# Patient Record
Sex: MALE | Race: WHITE | NOT HISPANIC OR LATINO | Employment: OTHER | URBAN - METROPOLITAN AREA
[De-identification: names, ages, dates, MRNs, and addresses within clinical notes are randomized per-mention and may not be internally consistent; named-entity substitution may affect disease eponyms.]

---

## 2020-01-03 ENCOUNTER — HOSPITAL ENCOUNTER (INPATIENT)
Facility: MEDICAL CENTER | Age: 85
LOS: 6 days | DRG: 183 | End: 2020-01-09
Attending: EMERGENCY MEDICINE | Admitting: HOSPITALIST
Payer: MEDICARE

## 2020-01-03 ENCOUNTER — HOSPITAL ENCOUNTER (OUTPATIENT)
Dept: RADIOLOGY | Facility: MEDICAL CENTER | Age: 85
End: 2020-01-03

## 2020-01-03 ENCOUNTER — APPOINTMENT (OUTPATIENT)
Dept: RADIOLOGY | Facility: MEDICAL CENTER | Age: 85
DRG: 183 | End: 2020-01-03
Attending: HOSPITALIST
Payer: MEDICARE

## 2020-01-03 DIAGNOSIS — S22.41XA CLOSED FRACTURE OF MULTIPLE RIBS OF RIGHT SIDE, INITIAL ENCOUNTER: ICD-10-CM

## 2020-01-03 DIAGNOSIS — R09.02 HYPOXIA: ICD-10-CM

## 2020-01-03 DIAGNOSIS — K59.00 CONSTIPATION, UNSPECIFIED CONSTIPATION TYPE: ICD-10-CM
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
## 2020-01-03 PROBLEM — Z86.711 HISTORY OF PULMONARY EMBOLISM: Status: ACTIVE | Noted: 2020-01-03

## 2020-01-03 PROBLEM — J96.01 ACUTE HYPOXEMIC RESPIRATORY FAILURE (HCC): Status: ACTIVE | Noted: 2020-01-03

## 2020-01-03 PROBLEM — N40.0 BPH (BENIGN PROSTATIC HYPERPLASIA): Status: ACTIVE | Noted: 2020-01-03

## 2020-01-03 PROBLEM — Z01.89 ENCOUNTER FOR GERIATRIC ASSESSMENT: Status: ACTIVE | Noted: 2020-01-03

## 2020-01-03 PROBLEM — S22.49XA FRACTURE OF MULTIPLE RIBS: Status: ACTIVE | Noted: 2020-01-03

## 2020-01-03 PROBLEM — I25.10 CAD (CORONARY ARTERY DISEASE): Status: ACTIVE | Noted: 2020-01-03

## 2020-01-03 PROBLEM — J90 PLEURAL EFFUSION: Status: ACTIVE | Noted: 2020-01-03

## 2020-01-03 PROBLEM — I10 HTN (HYPERTENSION): Status: ACTIVE | Noted: 2020-01-03

## 2020-01-03 LAB
ALBUMIN SERPL BCP-MCNC: 3.6 G/DL (ref 3.2–4.9)
ALBUMIN/GLOB SERPL: 1.3 G/DL
ALP SERPL-CCNC: 94 U/L (ref 30–99)
ALT SERPL-CCNC: 42 U/L (ref 2–50)
ANION GAP SERPL CALC-SCNC: 12 MMOL/L (ref 0–11.9)
AST SERPL-CCNC: 70 U/L (ref 12–45)
BASOPHILS # BLD AUTO: 0.4 % (ref 0–1.8)
BASOPHILS # BLD: 0.03 K/UL (ref 0–0.12)
BILIRUB SERPL-MCNC: 1 MG/DL (ref 0.1–1.5)
BUN SERPL-MCNC: 20 MG/DL (ref 8–22)
CALCIUM SERPL-MCNC: 8.2 MG/DL (ref 8.5–10.5)
CHLORIDE SERPL-SCNC: 99 MMOL/L (ref 96–112)
CO2 SERPL-SCNC: 22 MMOL/L (ref 20–33)
CREAT SERPL-MCNC: 1.05 MG/DL (ref 0.5–1.4)
EKG IMPRESSION: NORMAL
EOSINOPHIL # BLD AUTO: 0.05 K/UL (ref 0–0.51)
EOSINOPHIL NFR BLD: 0.7 % (ref 0–6.9)
ERYTHROCYTE [DISTWIDTH] IN BLOOD BY AUTOMATED COUNT: 47.2 FL (ref 35.9–50)
GLOBULIN SER CALC-MCNC: 2.7 G/DL (ref 1.9–3.5)
GLUCOSE SERPL-MCNC: 153 MG/DL (ref 65–99)
HCT VFR BLD AUTO: 37.1 % (ref 42–52)
HGB BLD-MCNC: 12.2 G/DL (ref 14–18)
IMM GRANULOCYTES # BLD AUTO: 0.02 K/UL (ref 0–0.11)
IMM GRANULOCYTES NFR BLD AUTO: 0.3 % (ref 0–0.9)
LYMPHOCYTES # BLD AUTO: 0.76 K/UL (ref 1–4.8)
LYMPHOCYTES NFR BLD: 11.4 % (ref 22–41)
MCH RBC QN AUTO: 32 PG (ref 27–33)
MCHC RBC AUTO-ENTMCNC: 32.9 G/DL (ref 33.7–35.3)
MCV RBC AUTO: 97.4 FL (ref 81.4–97.8)
MONOCYTES # BLD AUTO: 0.58 K/UL (ref 0–0.85)
MONOCYTES NFR BLD AUTO: 8.7 % (ref 0–13.4)
NEUTROPHILS # BLD AUTO: 5.24 K/UL (ref 1.82–7.42)
NEUTROPHILS NFR BLD: 78.5 % (ref 44–72)
NRBC # BLD AUTO: 0 K/UL
NRBC BLD-RTO: 0 /100 WBC
PLATELET # BLD AUTO: 193 K/UL (ref 164–446)
PMV BLD AUTO: 10.4 FL (ref 9–12.9)
POTASSIUM SERPL-SCNC: 4.2 MMOL/L (ref 3.6–5.5)
PROT SERPL-MCNC: 6.3 G/DL (ref 6–8.2)
RBC # BLD AUTO: 3.81 M/UL (ref 4.7–6.1)
SODIUM SERPL-SCNC: 133 MMOL/L (ref 135–145)
WBC # BLD AUTO: 6.7 K/UL (ref 4.8–10.8)

## 2020-01-03 PROCEDURE — 99223 1ST HOSP IP/OBS HIGH 75: CPT | Performed by: HOSPITALIST

## 2020-01-03 PROCEDURE — 36415 COLL VENOUS BLD VENIPUNCTURE: CPT

## 2020-01-03 PROCEDURE — 93005 ELECTROCARDIOGRAM TRACING: CPT | Performed by: SURGERY

## 2020-01-03 PROCEDURE — 94760 N-INVAS EAR/PLS OXIMETRY 1: CPT

## 2020-01-03 PROCEDURE — 85025 COMPLETE CBC W/AUTO DIFF WBC: CPT | Mod: 91

## 2020-01-03 PROCEDURE — 99285 EMERGENCY DEPT VISIT HI MDM: CPT

## 2020-01-03 PROCEDURE — 99358 PROLONG SERVICE W/O CONTACT: CPT | Performed by: HOSPITALIST

## 2020-01-03 PROCEDURE — 770006 HCHG ROOM/CARE - MED/SURG/GYN SEMI*

## 2020-01-03 PROCEDURE — 74018 RADEX ABDOMEN 1 VIEW: CPT

## 2020-01-03 PROCEDURE — 80053 COMPREHEN METABOLIC PANEL: CPT | Mod: 91

## 2020-01-03 RX ORDER — HYDROMORPHONE HYDROCHLORIDE 1 MG/ML
0.25 INJECTION, SOLUTION INTRAMUSCULAR; INTRAVENOUS; SUBCUTANEOUS
Status: DISCONTINUED | OUTPATIENT
Start: 2020-01-03 | End: 2020-01-04

## 2020-01-03 RX ORDER — POLYETHYLENE GLYCOL 3350 17 G/17G
1 POWDER, FOR SOLUTION ORAL
Status: DISCONTINUED | OUTPATIENT
Start: 2020-01-03 | End: 2020-01-05

## 2020-01-03 RX ORDER — BISACODYL 10 MG
10 SUPPOSITORY, RECTAL RECTAL
Status: DISCONTINUED | OUTPATIENT
Start: 2020-01-03 | End: 2020-01-03

## 2020-01-03 RX ORDER — ENEMA 19; 7 G/133ML; G/133ML
1 ENEMA RECTAL ONCE
Status: COMPLETED | OUTPATIENT
Start: 2020-01-04 | End: 2020-01-04

## 2020-01-03 RX ORDER — TAMSULOSIN HYDROCHLORIDE 0.4 MG/1
0.4 CAPSULE ORAL
Status: DISCONTINUED | OUTPATIENT
Start: 2020-01-04 | End: 2020-01-10 | Stop reason: HOSPADM

## 2020-01-03 RX ORDER — METOPROLOL SUCCINATE 25 MG/1
25 TABLET, EXTENDED RELEASE ORAL
Status: DISCONTINUED | OUTPATIENT
Start: 2020-01-04 | End: 2020-01-10 | Stop reason: HOSPADM

## 2020-01-03 RX ORDER — OXYCODONE HYDROCHLORIDE 5 MG/1
2.5 TABLET ORAL
Status: DISCONTINUED | OUTPATIENT
Start: 2020-01-03 | End: 2020-01-10 | Stop reason: HOSPADM

## 2020-01-03 RX ORDER — ONDANSETRON 2 MG/ML
4 INJECTION INTRAMUSCULAR; INTRAVENOUS EVERY 4 HOURS PRN
Status: DISCONTINUED | OUTPATIENT
Start: 2020-01-03 | End: 2020-01-10 | Stop reason: HOSPADM

## 2020-01-03 RX ORDER — GABAPENTIN 100 MG/1
100 CAPSULE ORAL 3 TIMES DAILY
Status: DISCONTINUED | OUTPATIENT
Start: 2020-01-04 | End: 2020-01-05

## 2020-01-03 RX ORDER — BISACODYL 10 MG
10 SUPPOSITORY, RECTAL RECTAL DAILY
Status: DISCONTINUED | OUTPATIENT
Start: 2020-01-04 | End: 2020-01-03

## 2020-01-03 RX ORDER — PRAVASTATIN SODIUM 20 MG
40 TABLET ORAL NIGHTLY
Status: DISCONTINUED | OUTPATIENT
Start: 2020-01-03 | End: 2020-01-10 | Stop reason: HOSPADM

## 2020-01-03 RX ORDER — METOPROLOL SUCCINATE 25 MG/1
25 TABLET, EXTENDED RELEASE ORAL DAILY
COMMUNITY

## 2020-01-03 RX ORDER — ESOMEPRAZOLE MAGNESIUM 40 MG/1
40 CAPSULE, DELAYED RELEASE ORAL
Status: DISCONTINUED | OUTPATIENT
Start: 2020-01-04 | End: 2020-01-03

## 2020-01-03 RX ORDER — ACETAMINOPHEN 325 MG/1
650 TABLET ORAL 4 TIMES DAILY
Status: DISCONTINUED | OUTPATIENT
Start: 2020-01-04 | End: 2020-01-03

## 2020-01-03 RX ORDER — AMOXICILLIN 250 MG
2 CAPSULE ORAL 2 TIMES DAILY
Status: DISCONTINUED | OUTPATIENT
Start: 2020-01-03 | End: 2020-01-05

## 2020-01-03 RX ORDER — AMITRIPTYLINE HYDROCHLORIDE 75 MG/1
75 TABLET ORAL NIGHTLY
Status: DISCONTINUED | OUTPATIENT
Start: 2020-01-03 | End: 2020-01-10 | Stop reason: HOSPADM

## 2020-01-03 RX ORDER — ACETAMINOPHEN 325 MG/1
650 TABLET ORAL 4 TIMES DAILY
Status: DISCONTINUED | OUTPATIENT
Start: 2020-01-04 | End: 2020-01-04

## 2020-01-03 RX ORDER — OMEPRAZOLE 20 MG/1
20 CAPSULE, DELAYED RELEASE ORAL
Status: DISCONTINUED | OUTPATIENT
Start: 2020-01-04 | End: 2020-01-10 | Stop reason: HOSPADM

## 2020-01-03 RX ORDER — LIDOCAINE 50 MG/G
1 PATCH TOPICAL EVERY 24 HOURS
Status: DISCONTINUED | OUTPATIENT
Start: 2020-01-04 | End: 2020-01-10 | Stop reason: HOSPADM

## 2020-01-03 RX ORDER — CELECOXIB 100 MG/1
100 CAPSULE ORAL DAILY
Status: DISCONTINUED | OUTPATIENT
Start: 2020-01-04 | End: 2020-01-10 | Stop reason: HOSPADM

## 2020-01-03 RX ORDER — ACETAMINOPHEN 325 MG/1
650 TABLET ORAL EVERY 6 HOURS PRN
Status: DISCONTINUED | OUTPATIENT
Start: 2020-01-03 | End: 2020-01-03

## 2020-01-03 RX ORDER — ONDANSETRON 4 MG/1
4 TABLET, ORALLY DISINTEGRATING ORAL EVERY 4 HOURS PRN
Status: DISCONTINUED | OUTPATIENT
Start: 2020-01-03 | End: 2020-01-10 | Stop reason: HOSPADM

## 2020-01-03 RX ORDER — OXYCODONE HYDROCHLORIDE 5 MG/1
5 TABLET ORAL
Status: DISCONTINUED | OUTPATIENT
Start: 2020-01-03 | End: 2020-01-04

## 2020-01-04 ENCOUNTER — APPOINTMENT (OUTPATIENT)
Dept: RADIOLOGY | Facility: MEDICAL CENTER | Age: 85
DRG: 183 | End: 2020-01-04
Attending: SURGERY
Payer: MEDICARE

## 2020-01-04 ENCOUNTER — APPOINTMENT (OUTPATIENT)
Dept: RADIOLOGY | Facility: MEDICAL CENTER | Age: 85
DRG: 183 | End: 2020-01-04
Attending: NURSE PRACTITIONER
Payer: MEDICARE

## 2020-01-04 PROBLEM — K59.00 CONSTIPATION: Status: ACTIVE | Noted: 2020-01-04

## 2020-01-04 PROBLEM — D64.9 ANEMIA: Status: ACTIVE | Noted: 2020-01-04

## 2020-01-04 LAB
ALBUMIN SERPL BCP-MCNC: 3.4 G/DL (ref 3.2–4.9)
ALBUMIN SERPL BCP-MCNC: 3.5 G/DL (ref 3.2–4.9)
ALBUMIN/GLOB SERPL: 1.2 G/DL
ALBUMIN/GLOB SERPL: 1.3 G/DL
ALP SERPL-CCNC: 123 U/L (ref 30–99)
ALP SERPL-CCNC: 125 U/L (ref 30–99)
ALT SERPL-CCNC: 116 U/L (ref 2–50)
ALT SERPL-CCNC: 118 U/L (ref 2–50)
AMMONIA PLAS-SCNC: 41 UMOL/L (ref 11–45)
ANION GAP SERPL CALC-SCNC: 8 MMOL/L (ref 0–11.9)
ANION GAP SERPL CALC-SCNC: 9 MMOL/L (ref 0–11.9)
APAP SERPL-MCNC: <10 UG/ML (ref 10–30)
AST SERPL-CCNC: 143 U/L (ref 12–45)
AST SERPL-CCNC: 170 U/L (ref 12–45)
BASOPHILS # BLD AUTO: 0.3 % (ref 0–1.8)
BASOPHILS # BLD AUTO: 0.5 % (ref 0–1.8)
BASOPHILS # BLD: 0.02 K/UL (ref 0–0.12)
BASOPHILS # BLD: 0.03 K/UL (ref 0–0.12)
BILIRUB SERPL-MCNC: 1 MG/DL (ref 0.1–1.5)
BILIRUB SERPL-MCNC: 1 MG/DL (ref 0.1–1.5)
BUN SERPL-MCNC: 22 MG/DL (ref 8–22)
BUN SERPL-MCNC: 22 MG/DL (ref 8–22)
CALCIUM SERPL-MCNC: 8.1 MG/DL (ref 8.5–10.5)
CALCIUM SERPL-MCNC: 8.3 MG/DL (ref 8.5–10.5)
CHLORIDE SERPL-SCNC: 101 MMOL/L (ref 96–112)
CHLORIDE SERPL-SCNC: 102 MMOL/L (ref 96–112)
CO2 SERPL-SCNC: 22 MMOL/L (ref 20–33)
CO2 SERPL-SCNC: 22 MMOL/L (ref 20–33)
CREAT SERPL-MCNC: 0.98 MG/DL (ref 0.5–1.4)
CREAT SERPL-MCNC: 0.98 MG/DL (ref 0.5–1.4)
D DIMER PPP IA.FEU-MCNC: 1.77 UG/ML (FEU) (ref 0–0.5)
EOSINOPHIL # BLD AUTO: 0.06 K/UL (ref 0–0.51)
EOSINOPHIL # BLD AUTO: 0.06 K/UL (ref 0–0.51)
EOSINOPHIL NFR BLD: 0.9 % (ref 0–6.9)
EOSINOPHIL NFR BLD: 0.9 % (ref 0–6.9)
ERYTHROCYTE [DISTWIDTH] IN BLOOD BY AUTOMATED COUNT: 45.8 FL (ref 35.9–50)
ERYTHROCYTE [DISTWIDTH] IN BLOOD BY AUTOMATED COUNT: 46.5 FL (ref 35.9–50)
GLOBULIN SER CALC-MCNC: 2.8 G/DL (ref 1.9–3.5)
GLOBULIN SER CALC-MCNC: 2.8 G/DL (ref 1.9–3.5)
GLUCOSE SERPL-MCNC: 156 MG/DL (ref 65–99)
GLUCOSE SERPL-MCNC: 160 MG/DL (ref 65–99)
HAV IGM SERPL QL IA: NEGATIVE
HBV CORE IGM SER QL: NEGATIVE
HBV SURFACE AG SER QL: NEGATIVE
HCT VFR BLD AUTO: 35.6 % (ref 42–52)
HCT VFR BLD AUTO: 35.7 % (ref 42–52)
HCV AB SER QL: NEGATIVE
HGB BLD-MCNC: 11.8 G/DL (ref 14–18)
HGB BLD-MCNC: 11.9 G/DL (ref 14–18)
IMM GRANULOCYTES # BLD AUTO: 0.02 K/UL (ref 0–0.11)
IMM GRANULOCYTES # BLD AUTO: 0.02 K/UL (ref 0–0.11)
IMM GRANULOCYTES NFR BLD AUTO: 0.3 % (ref 0–0.9)
IMM GRANULOCYTES NFR BLD AUTO: 0.3 % (ref 0–0.9)
LYMPHOCYTES # BLD AUTO: 0.63 K/UL (ref 1–4.8)
LYMPHOCYTES # BLD AUTO: 0.66 K/UL (ref 1–4.8)
LYMPHOCYTES NFR BLD: 9.6 % (ref 22–41)
LYMPHOCYTES NFR BLD: 9.8 % (ref 22–41)
MCH RBC QN AUTO: 31.9 PG (ref 27–33)
MCH RBC QN AUTO: 32.2 PG (ref 27–33)
MCHC RBC AUTO-ENTMCNC: 33.1 G/DL (ref 33.7–35.3)
MCHC RBC AUTO-ENTMCNC: 33.4 G/DL (ref 33.7–35.3)
MCV RBC AUTO: 96.5 FL (ref 81.4–97.8)
MCV RBC AUTO: 96.5 FL (ref 81.4–97.8)
MONOCYTES # BLD AUTO: 0.61 K/UL (ref 0–0.85)
MONOCYTES # BLD AUTO: 0.64 K/UL (ref 0–0.85)
MONOCYTES NFR BLD AUTO: 9.3 % (ref 0–13.4)
MONOCYTES NFR BLD AUTO: 9.5 % (ref 0–13.4)
NEUTROPHILS # BLD AUTO: 5.07 K/UL (ref 1.82–7.42)
NEUTROPHILS # BLD AUTO: 5.46 K/UL (ref 1.82–7.42)
NEUTROPHILS NFR BLD: 79 % (ref 44–72)
NEUTROPHILS NFR BLD: 79.6 % (ref 44–72)
NRBC # BLD AUTO: 0 K/UL
NRBC # BLD AUTO: 0 K/UL
NRBC BLD-RTO: 0 /100 WBC
NRBC BLD-RTO: 0 /100 WBC
PLATELET # BLD AUTO: 174 K/UL (ref 164–446)
PLATELET # BLD AUTO: 175 K/UL (ref 164–446)
PMV BLD AUTO: 10.3 FL (ref 9–12.9)
PMV BLD AUTO: 10.3 FL (ref 9–12.9)
POTASSIUM SERPL-SCNC: 4 MMOL/L (ref 3.6–5.5)
POTASSIUM SERPL-SCNC: 4.1 MMOL/L (ref 3.6–5.5)
PROT SERPL-MCNC: 6.2 G/DL (ref 6–8.2)
PROT SERPL-MCNC: 6.3 G/DL (ref 6–8.2)
RBC # BLD AUTO: 3.69 M/UL (ref 4.7–6.1)
RBC # BLD AUTO: 3.7 M/UL (ref 4.7–6.1)
SODIUM SERPL-SCNC: 132 MMOL/L (ref 135–145)
SODIUM SERPL-SCNC: 132 MMOL/L (ref 135–145)
TSH SERPL DL<=0.005 MIU/L-ACNC: 3.87 UIU/ML (ref 0.38–5.33)
WBC # BLD AUTO: 6.4 K/UL (ref 4.8–10.8)
WBC # BLD AUTO: 6.9 K/UL (ref 4.8–10.8)

## 2020-01-04 PROCEDURE — A9270 NON-COVERED ITEM OR SERVICE: HCPCS | Performed by: NURSE PRACTITIONER

## 2020-01-04 PROCEDURE — 80074 ACUTE HEPATITIS PANEL: CPT

## 2020-01-04 PROCEDURE — A9270 NON-COVERED ITEM OR SERVICE: HCPCS | Performed by: HOSPITALIST

## 2020-01-04 PROCEDURE — 80307 DRUG TEST PRSMV CHEM ANLYZR: CPT

## 2020-01-04 PROCEDURE — 99233 SBSQ HOSP IP/OBS HIGH 50: CPT | Performed by: INTERNAL MEDICINE

## 2020-01-04 PROCEDURE — 85379 FIBRIN DEGRADATION QUANT: CPT

## 2020-01-04 PROCEDURE — 76705 ECHO EXAM OF ABDOMEN: CPT

## 2020-01-04 PROCEDURE — 84443 ASSAY THYROID STIM HORMONE: CPT

## 2020-01-04 PROCEDURE — 80053 COMPREHEN METABOLIC PANEL: CPT

## 2020-01-04 PROCEDURE — A9270 NON-COVERED ITEM OR SERVICE: HCPCS | Performed by: SURGERY

## 2020-01-04 PROCEDURE — 770006 HCHG ROOM/CARE - MED/SURG/GYN SEMI*

## 2020-01-04 PROCEDURE — 700102 HCHG RX REV CODE 250 W/ 637 OVERRIDE(OP): Performed by: HOSPITALIST

## 2020-01-04 PROCEDURE — 700102 HCHG RX REV CODE 250 W/ 637 OVERRIDE(OP): Performed by: SURGERY

## 2020-01-04 PROCEDURE — 93970 EXTREMITY STUDY: CPT

## 2020-01-04 PROCEDURE — 85025 COMPLETE CBC W/AUTO DIFF WBC: CPT

## 2020-01-04 PROCEDURE — 700102 HCHG RX REV CODE 250 W/ 637 OVERRIDE(OP): Performed by: NURSE PRACTITIONER

## 2020-01-04 PROCEDURE — 700101 HCHG RX REV CODE 250: Performed by: SURGERY

## 2020-01-04 PROCEDURE — 71045 X-RAY EXAM CHEST 1 VIEW: CPT

## 2020-01-04 PROCEDURE — 82140 ASSAY OF AMMONIA: CPT

## 2020-01-04 PROCEDURE — 36415 COLL VENOUS BLD VENIPUNCTURE: CPT

## 2020-01-04 RX ORDER — ACETAMINOPHEN 325 MG/1
650 TABLET ORAL EVERY 6 HOURS PRN
Status: DISCONTINUED | OUTPATIENT
Start: 2020-01-04 | End: 2020-01-10 | Stop reason: HOSPADM

## 2020-01-04 RX ORDER — SIMETHICONE 40MG/0.6ML
40 SUSPENSION, DROPS(FINAL DOSAGE FORM)(ML) ORAL EVERY 6 HOURS PRN
Status: DISCONTINUED | OUTPATIENT
Start: 2020-01-04 | End: 2020-01-10 | Stop reason: HOSPADM

## 2020-01-04 RX ORDER — LACTULOSE 20 G/30ML
15 SOLUTION ORAL 3 TIMES DAILY
Status: DISCONTINUED | OUTPATIENT
Start: 2020-01-04 | End: 2020-01-10 | Stop reason: HOSPADM

## 2020-01-04 RX ADMIN — MAGNESIUM CITRATE 148 ML: 1.75 LIQUID ORAL at 16:22

## 2020-01-04 RX ADMIN — OMEPRAZOLE 20 MG: 20 CAPSULE, DELAYED RELEASE ORAL at 05:51

## 2020-01-04 RX ADMIN — PRAVASTATIN SODIUM 40 MG: 20 TABLET ORAL at 00:50

## 2020-01-04 RX ADMIN — LACTULOSE 15 ML: 20 SOLUTION ORAL at 16:56

## 2020-01-04 RX ADMIN — SENNOSIDES AND DOCUSATE SODIUM 2 TABLET: 8.6; 5 TABLET ORAL at 16:56

## 2020-01-04 RX ADMIN — SENNOSIDES AND DOCUSATE SODIUM 2 TABLET: 8.6; 5 TABLET ORAL at 00:51

## 2020-01-04 RX ADMIN — LIDOCAINE 1 PATCH: 50 PATCH TOPICAL at 00:51

## 2020-01-04 RX ADMIN — SODIUM PHOSPHATE 133 ML: 7; 19 ENEMA RECTAL at 10:30

## 2020-01-04 RX ADMIN — AMITRIPTYLINE HYDROCHLORIDE 75 MG: 75 TABLET, FILM COATED ORAL at 00:50

## 2020-01-04 RX ADMIN — GABAPENTIN 100 MG: 100 CAPSULE ORAL at 13:51

## 2020-01-04 RX ADMIN — ASPIRIN 81 MG: 81 TABLET, COATED ORAL at 05:51

## 2020-01-04 RX ADMIN — CELECOXIB 100 MG: 100 CAPSULE ORAL at 05:51

## 2020-01-04 RX ADMIN — GABAPENTIN 100 MG: 100 CAPSULE ORAL at 05:51

## 2020-01-04 RX ADMIN — AMITRIPTYLINE HYDROCHLORIDE 75 MG: 75 TABLET, FILM COATED ORAL at 21:07

## 2020-01-04 RX ADMIN — GABAPENTIN 100 MG: 100 CAPSULE ORAL at 16:56

## 2020-01-04 RX ADMIN — TAMSULOSIN HYDROCHLORIDE 0.4 MG: 0.4 CAPSULE ORAL at 10:01

## 2020-01-04 RX ADMIN — LACTULOSE 15 ML: 20 SOLUTION ORAL at 13:51

## 2020-01-04 ASSESSMENT — ENCOUNTER SYMPTOMS
SENSORY CHANGE: 0
FOCAL WEAKNESS: 0
COUGH: 0
BLOOD IN STOOL: 0
SPUTUM PRODUCTION: 0
CONSTIPATION: 1
CLAUDICATION: 0
PALPITATIONS: 0
MYALGIAS: 0
HALLUCINATIONS: 0
CHILLS: 0
ORTHOPNEA: 0
PND: 0
HEMOPTYSIS: 0
WEAKNESS: 0
BRUISES/BLEEDS EASILY: 0
HEARTBURN: 0
SHORTNESS OF BREATH: 1
MYALGIAS: 1
ABDOMINAL PAIN: 1
DEPRESSION: 0
BLURRED VISION: 0
FLANK PAIN: 1
NAUSEA: 1
SHORTNESS OF BREATH: 0
DIAPHORESIS: 0
VOMITING: 0
FEVER: 0
DIARRHEA: 0
WEIGHT LOSS: 0
DOUBLE VISION: 0
DIZZINESS: 0
WHEEZING: 0
EYE DISCHARGE: 0
NAUSEA: 0
SPEECH CHANGE: 0

## 2020-01-04 ASSESSMENT — COGNITIVE AND FUNCTIONAL STATUS - GENERAL
MOVING FROM LYING ON BACK TO SITTING ON SIDE OF FLAT BED: A LITTLE
EATING MEALS: A LITTLE
HELP NEEDED FOR BATHING: A LITTLE
WALKING IN HOSPITAL ROOM: A LITTLE
DAILY ACTIVITIY SCORE: 19
MOVING TO AND FROM BED TO CHAIR: A LITTLE
TURNING FROM BACK TO SIDE WHILE IN FLAT BAD: A LITTLE
SUGGESTED CMS G CODE MODIFIER DAILY ACTIVITY: CK
MOBILITY SCORE: 18
CLIMB 3 TO 5 STEPS WITH RAILING: A LITTLE
PERSONAL GROOMING: A LITTLE
DRESSING REGULAR UPPER BODY CLOTHING: A LITTLE
STANDING UP FROM CHAIR USING ARMS: A LITTLE
DRESSING REGULAR LOWER BODY CLOTHING: A LITTLE
SUGGESTED CMS G CODE MODIFIER MOBILITY: CK

## 2020-01-04 ASSESSMENT — COPD QUESTIONNAIRES
HAVE YOU SMOKED AT LEAST 100 CIGARETTES IN YOUR ENTIRE LIFE: NO/DON'T KNOW
COPD SCREENING SCORE: 2
DO YOU EVER COUGH UP ANY MUCUS OR PHLEGM?: NO/ONLY WITH OCCASIONAL COLDS OR INFECTIONS
IN THE PAST 12 MONTHS DO YOU DO LESS THAN YOU USED TO BECAUSE OF YOUR BREATHING PROBLEMS: DISAGREE/UNSURE
DURING THE PAST 4 WEEKS HOW MUCH DID YOU FEEL SHORT OF BREATH: NONE/LITTLE OF THE TIME

## 2020-01-04 ASSESSMENT — PATIENT HEALTH QUESTIONNAIRE - PHQ9
2. FEELING DOWN, DEPRESSED, IRRITABLE, OR HOPELESS: NOT AT ALL
1. LITTLE INTEREST OR PLEASURE IN DOING THINGS: NOT AT ALL
SUM OF ALL RESPONSES TO PHQ9 QUESTIONS 1 AND 2: 0

## 2020-01-04 ASSESSMENT — LIFESTYLE VARIABLES
TOTAL SCORE: 0
EVER FELT BAD OR GUILTY ABOUT YOUR DRINKING: NO
EVER HAD A DRINK FIRST THING IN THE MORNING TO STEADY YOUR NERVES TO GET RID OF A HANGOVER: NO
SUBSTANCE_ABUSE: 0
HAVE YOU EVER FELT YOU SHOULD CUT DOWN ON YOUR DRINKING: NO
HAVE PEOPLE ANNOYED YOU BY CRITICIZING YOUR DRINKING: NO
CONSUMPTION TOTAL: NEGATIVE
DOES PATIENT WANT TO STOP DRINKING: NO
ALCOHOL_USE: NO
EVER_SMOKED: NEVER
TOTAL SCORE: 0
AVERAGE NUMBER OF DAYS PER WEEK YOU HAVE A DRINK CONTAINING ALCOHOL: 0
TOTAL SCORE: 0
ON A TYPICAL DAY WHEN YOU DRINK ALCOHOL HOW MANY DRINKS DO YOU HAVE: 0
HOW MANY TIMES IN THE PAST YEAR HAVE YOU HAD 5 OR MORE DRINKS IN A DAY: 0

## 2020-01-04 NOTE — RESPIRATORY CARE
COPD EDUCATION by COPD CLINICAL EDUCATOR  1/4/2020 at 8:19 AM by Annetta Floyd     Patient reviewed by COPD education team. Patient does not have a history or diagnosis of COPD and is a non-smoker, therefore does not qualify for the COPD program.

## 2020-01-04 NOTE — ASSESSMENT & PLAN NOTE
Patient fell 48 hrs prior to presentation to [facility].   Non trauma activation.  Trauma consulting  Dustin Diego MD, Trauma

## 2020-01-04 NOTE — CARE PLAN
Problem: Knowledge Deficit  Goal: Knowledge of disease process/condition, treatment plan, diagnostic tests, and medications will improve  Outcome: PROGRESSING AS EXPECTED  Note:   US RUQ ordered     Problem: Bowel/Gastric:  Goal: Normal bowel function is maintained or improved  Outcome: PROGRESSING SLOWER THAN EXPECTED  Note:   Enema ordered

## 2020-01-04 NOTE — CARE PLAN
Problem: Communication  Goal: The ability to communicate needs accurately and effectively will improve  Outcome: PROGRESSING AS EXPECTED     Problem: Safety  Goal: Will remain free from injury  Outcome: PROGRESSING AS EXPECTED     Problem: Knowledge Deficit  Goal: Knowledge of disease process/condition, treatment plan, diagnostic tests, and medications will improve  Outcome: PROGRESSING AS EXPECTED     Problem: Pain Management  Goal: Pain level will decrease to patient's comfort goal  Outcome: PROGRESSING AS EXPECTED     Problem: Respiratory:  Goal: Respiratory status will improve  Outcome: PROGRESSING AS EXPECTED

## 2020-01-04 NOTE — PROGRESS NOTES
Trauma / Surgical Daily Progress Note    Date of Service  1/4/2020    Interval Events  No issues since admission  CXR stable  Discussed need for bowel regimen and application of multi-modal pain regimen with medical practitioner  Entire colon filled with stool on 12/30 CT from [facility]  OK for liquid diet    Review of Systems  ROS     Vital Signs  Temp:  [36.6 °C (97.8 °F)-37.1 °C (98.8 °F)] 36.8 °C (98.2 °F)  Pulse:  [86-94] 87  Resp:  [15-22] 20  BP: ()/(64-78) 103/72  SpO2:  [90 %-95 %] 95 %    Physical Exam  Physical Exam  Vitals signs and nursing note reviewed.   Cardiovascular:      Rate and Rhythm: Normal rate and regular rhythm.      Pulses: Normal pulses. No decreased pulses.   Pulmonary:      Effort: Pulmonary effort is normal.      Breath sounds: Normal air entry. Examination of the right-lower field reveals decreased breath sounds. Decreased breath sounds present.   Abdominal:      General: There is distension.      Palpations: Abdomen is soft.      Tenderness: There is no tenderness.         Laboratory  Recent Results (from the past 24 hour(s))   CBC WITH DIFFERENTIAL    Collection Time: 01/03/20  9:38 PM   Result Value Ref Range    WBC 6.7 4.8 - 10.8 K/uL    RBC 3.81 (L) 4.70 - 6.10 M/uL    Hemoglobin 12.2 (L) 14.0 - 18.0 g/dL    Hematocrit 37.1 (L) 42.0 - 52.0 %    MCV 97.4 81.4 - 97.8 fL    MCH 32.0 27.0 - 33.0 pg    MCHC 32.9 (L) 33.7 - 35.3 g/dL    RDW 47.2 35.9 - 50.0 fL    Platelet Count 193 164 - 446 K/uL    MPV 10.4 9.0 - 12.9 fL    Neutrophils-Polys 78.50 (H) 44.00 - 72.00 %    Lymphocytes 11.40 (L) 22.00 - 41.00 %    Monocytes 8.70 0.00 - 13.40 %    Eosinophils 0.70 0.00 - 6.90 %    Basophils 0.40 0.00 - 1.80 %    Immature Granulocytes 0.30 0.00 - 0.90 %    Nucleated RBC 0.00 /100 WBC    Neutrophils (Absolute) 5.24 1.82 - 7.42 K/uL    Lymphs (Absolute) 0.76 (L) 1.00 - 4.80 K/uL    Monos (Absolute) 0.58 0.00 - 0.85 K/uL    Eos (Absolute) 0.05 0.00 - 0.51 K/uL    Baso (Absolute) 0.03 0.00 -  0.12 K/uL    Immature Granulocytes (abs) 0.02 0.00 - 0.11 K/uL    NRBC (Absolute) 0.00 K/uL   Comp Metabolic Panel    Collection Time: 20  9:38 PM   Result Value Ref Range    Sodium 133 (L) 135 - 145 mmol/L    Potassium 4.2 3.6 - 5.5 mmol/L    Chloride 99 96 - 112 mmol/L    Co2 22 20 - 33 mmol/L    Anion Gap 12.0 (H) 0.0 - 11.9    Glucose 153 (H) 65 - 99 mg/dL    Bun 20 8 - 22 mg/dL    Creatinine 1.05 0.50 - 1.40 mg/dL    Calcium 8.2 (L) 8.5 - 10.5 mg/dL    AST(SGOT) 70 (H) 12 - 45 U/L    ALT(SGPT) 42 2 - 50 U/L    Alkaline Phosphatase 94 30 - 99 U/L    Total Bilirubin 1.0 0.1 - 1.5 mg/dL    Albumin 3.6 3.2 - 4.9 g/dL    Total Protein 6.3 6.0 - 8.2 g/dL    Globulin 2.7 1.9 - 3.5 g/dL    A-G Ratio 1.3 g/dL   ESTIMATED GFR    Collection Time: 20  9:38 PM   Result Value Ref Range    GFR If African American >60 >60 mL/min/1.73 m 2    GFR If Non African American >60 >60 mL/min/1.73 m 2   EKG    Collection Time: 20 11:44 PM   Result Value Ref Range    Report       [facility] Emergency Dept.    Test Date:  2020  Pt Name:    CRUZ AVILA             Department: ER  MRN:        [id number]                      Room:       [facility]  Gender:     Male                         Technician: [id number]  :        1934                   Requested By:CRUZ SELF  Order #:    [id number]                    Reading MD:    Measurements  Intervals                                Axis  Rate:       87                           P:          15  OH:         232                          QRS:        44  QRSD:       94                           T:          -77  QT:         376  QTc:        453    Interpretive Statements  SINUS RHYTHM  FIRST DEGREE AV BLOCK  NONSPECIFIC T ABNORMALITIES, INFERIOR LEADS  No previous ECG available for comparison     CBC with Differential    Collection Time: 20  3:15 AM   Result Value Ref Range    WBC 6.9 4.8 - 10.8 K/uL    RBC 3.69 (L) 4.70 - 6.10 M/uL    Hemoglobin  11.9 (L) 14.0 - 18.0 g/dL    Hematocrit 35.6 (L) 42.0 - 52.0 %    MCV 96.5 81.4 - 97.8 fL    MCH 32.2 27.0 - 33.0 pg    MCHC 33.4 (L) 33.7 - 35.3 g/dL    RDW 45.8 35.9 - 50.0 fL    Platelet Count 175 164 - 446 K/uL    MPV 10.3 9.0 - 12.9 fL    Neutrophils-Polys 79.60 (H) 44.00 - 72.00 %    Lymphocytes 9.60 (L) 22.00 - 41.00 %    Monocytes 9.30 0.00 - 13.40 %    Eosinophils 0.90 0.00 - 6.90 %    Basophils 0.30 0.00 - 1.80 %    Immature Granulocytes 0.30 0.00 - 0.90 %    Nucleated RBC 0.00 /100 WBC    Neutrophils (Absolute) 5.46 1.82 - 7.42 K/uL    Lymphs (Absolute) 0.66 (L) 1.00 - 4.80 K/uL    Monos (Absolute) 0.64 0.00 - 0.85 K/uL    Eos (Absolute) 0.06 0.00 - 0.51 K/uL    Baso (Absolute) 0.02 0.00 - 0.12 K/uL    Immature Granulocytes (abs) 0.02 0.00 - 0.11 K/uL    NRBC (Absolute) 0.00 K/uL   Comp Metabolic Panel (CMP)    Collection Time: 01/04/20  3:15 AM   Result Value Ref Range    Sodium 132 (L) 135 - 145 mmol/L    Potassium 4.1 3.6 - 5.5 mmol/L    Chloride 101 96 - 112 mmol/L    Co2 22 20 - 33 mmol/L    Anion Gap 9.0 0.0 - 11.9    Glucose 160 (H) 65 - 99 mg/dL    Bun 22 8 - 22 mg/dL    Creatinine 0.98 0.50 - 1.40 mg/dL    Calcium 8.3 (L) 8.5 - 10.5 mg/dL    AST(SGOT) 170 (H) 12 - 45 U/L    ALT(SGPT) 118 (H) 2 - 50 U/L    Alkaline Phosphatase 125 (H) 30 - 99 U/L    Total Bilirubin 1.0 0.1 - 1.5 mg/dL    Albumin 3.5 3.2 - 4.9 g/dL    Total Protein 6.3 6.0 - 8.2 g/dL    Globulin 2.8 1.9 - 3.5 g/dL    A-G Ratio 1.3 g/dL   ESTIMATED GFR    Collection Time: 01/04/20  3:15 AM   Result Value Ref Range    GFR If African American >60 >60 mL/min/1.73 m 2    GFR If Non African American >60 >60 mL/min/1.73 m 2   CBC WITH DIFFERENTIAL    Collection Time: 01/04/20  5:03 AM   Result Value Ref Range    WBC 6.4 4.8 - 10.8 K/uL    RBC 3.70 (L) 4.70 - 6.10 M/uL    Hemoglobin 11.8 (L) 14.0 - 18.0 g/dL    Hematocrit 35.7 (L) 42.0 - 52.0 %    MCV 96.5 81.4 - 97.8 fL    MCH 31.9 27.0 - 33.0 pg    MCHC 33.1 (L) 33.7 - 35.3 g/dL    RDW  46.5 35.9 - 50.0 fL    Platelet Count 174 164 - 446 K/uL    MPV 10.3 9.0 - 12.9 fL    Neutrophils-Polys 79.00 (H) 44.00 - 72.00 %    Lymphocytes 9.80 (L) 22.00 - 41.00 %    Monocytes 9.50 0.00 - 13.40 %    Eosinophils 0.90 0.00 - 6.90 %    Basophils 0.50 0.00 - 1.80 %    Immature Granulocytes 0.30 0.00 - 0.90 %    Nucleated RBC 0.00 /100 WBC    Neutrophils (Absolute) 5.07 1.82 - 7.42 K/uL    Lymphs (Absolute) 0.63 (L) 1.00 - 4.80 K/uL    Monos (Absolute) 0.61 0.00 - 0.85 K/uL    Eos (Absolute) 0.06 0.00 - 0.51 K/uL    Baso (Absolute) 0.03 0.00 - 0.12 K/uL    Immature Granulocytes (abs) 0.02 0.00 - 0.11 K/uL    NRBC (Absolute) 0.00 K/uL   Comp Metabolic Panel    Collection Time: 01/04/20  5:03 AM   Result Value Ref Range    Sodium 132 (L) 135 - 145 mmol/L    Potassium 4.0 3.6 - 5.5 mmol/L    Chloride 102 96 - 112 mmol/L    Co2 22 20 - 33 mmol/L    Anion Gap 8.0 0.0 - 11.9    Glucose 156 (H) 65 - 99 mg/dL    Bun 22 8 - 22 mg/dL    Creatinine 0.98 0.50 - 1.40 mg/dL    Calcium 8.1 (L) 8.5 - 10.5 mg/dL    AST(SGOT) 143 (H) 12 - 45 U/L    ALT(SGPT) 116 (H) 2 - 50 U/L    Alkaline Phosphatase 123 (H) 30 - 99 U/L    Total Bilirubin 1.0 0.1 - 1.5 mg/dL    Albumin 3.4 3.2 - 4.9 g/dL    Total Protein 6.2 6.0 - 8.2 g/dL    Globulin 2.8 1.9 - 3.5 g/dL    A-G Ratio 1.2 g/dL   ESTIMATED GFR    Collection Time: 01/04/20  5:03 AM   Result Value Ref Range    GFR If African American >60 >60 mL/min/1.73 m 2    GFR If Non African American >60 >60 mL/min/1.73 m 2       Fluids    Intake/Output Summary (Last 24 hours) at 1/4/2020 0952  Last data filed at 1/4/2020 0318  Gross per 24 hour   Intake 0 ml   Output 0 ml   Net 0 ml       Core Measures & Quality Metrics  Core Measures & Quality Metrics  GENE Score  ETOH Screening    Assessment/Plan  * Fracture of multiple ribs- (present on admission)  Assessment & Plan  Right 8th, 9th, and 10th rib fractures.  Aggressive multimodal pain management, and pulmonary hygiene.   Serial chest  radiographs.    Pleural effusion- (present on admission)  Assessment & Plan  Mild right sided pleural effusion.   No chest tube at this time  Supplemental oxygen to maintain O2 saturations greater than 95%  Aggressive pulmonary hygiene. Serial chest radiographs.    Encounter for geriatric assessment- (present on admission)  Assessment & Plan  Geriatric consultation requested upon admission.    Fall from ground level- (present on admission)  Assessment & Plan  Patient fell 48 hrs prior to presentation to [facility] ED.   Non trauma activation.  Trauma consulting  Dustin Diego MD, Trauma         Garret Diego MD

## 2020-01-04 NOTE — PROGRESS NOTES
Aaox4, c/o right rib cage pain when moving, MANCUSO while in bed but claims unable to keep balance when oob, POC discussed, US RUQ but ate breakfast, will keep NPO for test, POC discussed, enema ordered, encouraged use of IS, needs attended.

## 2020-01-04 NOTE — ED PROVIDER NOTES
ED Provider Note    ED Provider Note    Primary care provider: MAGDY Phillips  Means of arrival: EMS/transfer  History obtained from: Patient/medical record    CHIEF COMPLAINT  Chief Complaint   Patient presents with   • Rib Pain     known 8, 9, 10 right rib fractures with right pleural effusion     Seen at 10:45 PM.   HPI  Dustin Andrade is a 85 y.o. male who presents to the Emergency Department as a transfer from [place] for rib fractures.  The patient was inpatient at their facility and discharged 48 hours ago.  On the day of discharge prior to being discharged the patient fell at the hospital.  He had x-rays taken that were negative.  The patient went home.  He developed worsening right posterior rib pain, worse with deep inspiration and movement.  He went back to the ER there.  Initial CT was read normal by the radiologist with some atelectasis on the right.  The ERP at the outlying facility reviewed the images and feels that there is a third ninth and 10th rib fractures, he was sent here due to his advanced age, multiple rib fractures and trauma.    The patient does note some shortness of breath, oxygenation is 86% on room air, normally he is not on supplemental O2.  He is on Coumadin, he does not know why he takes this, he believes it is for prediabetes.  He denies any fevers, chills, numbness, weakness, bleeding diathesis or impaired immunity.  REVIEW OF SYSTEMS  See HPI,   Remainder of ROS negative.     PAST MEDICAL HISTORY   has a past medical history of Cancer (HCC), Cataract, Myocardial infarct (HCC), and S/P CABG x 3.    SURGICAL HISTORY   has a past surgical history that includes hernia repair and cholecystoenterostomy.    SOCIAL HISTORY  Social History     Tobacco Use   • Smoking status: Never Smoker   • Smokeless tobacco: Never Used   Substance Use Topics   • Alcohol use: Yes   • Drug use: No      Social History     Substance and Sexual Activity   Drug Use No       FAMILY  "HISTORY  No family history on file.    CURRENT MEDICATIONS  Reviewed.  See Encounter Summary.     ALLERGIES  No Known Allergies    PHYSICAL EXAM  VITAL SIGNS: /64   Pulse 92   Temp 36.6 °C (97.9 °F) (Temporal)   Resp 20   Ht 1.854 m (6' 1\")   Wt 102.1 kg (225 lb)   SpO2 93%   BMI 29.69 kg/m²   Constitutional: Awake, alert in no apparent distress.  HENT: Normocephalic, Bilateral external ears normal. Nose normal.   Eyes: Conjunctiva normal, non-icteric, EOMI.    Thorax & Lungs: Bibasilar crackles, greater on the right, diffuse ecchymosis on the right posterior axillary line extending down to the right flank.  No crepitus.  The thorax is tender on the right axillary line.  Cardiovascular: Regular rate, Regular rhythm, No murmurs, rubs or gallops.  Abdomen:  Soft, nontender, nondistended, normal active bowel sounds.   :    Skin: Visualized skin is  Dry, No erythema, No rash.   Musculoskeletal:   No cyanosis, clubbing or edema.  Neurologic: Alert, Grossly non-focal.   Psychiatric: Normal affect, Normal mood  Lymphatic:  No cervical LAD      RADIOLOGY  OUTSIDE IMAGES-CT CHEST   Final Result      OUTSIDE IMAGES-DX CHEST   Final Result      [id number] VIEW    (Results Pending)   DX-CHEST-PORTABLE (1 VIEW)    (Results Pending)         COURSE & MEDICAL DECISION MAKING  Pertinent Labs & Imaging studies reviewed. (See chart for details)    Differential diagnoses include but are not limited to: Pulmonary contusion, atelectasis, hemothorax    10:45 PM - Medical record reviewed, patient seen and examined at bedside.  The CT was reviewed, I see to posterior rib fractures on the right.  There is a moderate effusion.  Trauma surgery paged for consultation.  INR is 1.0.  Labs today from the outlying facility are unremarkable.    Decision Making:  This is a 85 y.o. year old male who presents with persistent right-sided chest pain after a fall when he was an inpatient at [facility] nearly 72 hours ago.  The initial " CT was read as atelectasis, I I do see a few irregularities on the posterior right ribs, likely at least 2 consecutive rib fractures and possibly a small hemothorax.  The patient is hemodynamically stable, his INR is 1.0 so likely he is not taking his Coumadin.  He does have an increased oxygen demand, he is satting in the low 90s on 4 L nasal cannula, in the mid 80s on room air which is new for him.  There is no pneumothorax, no indication for chest tube.    I discussed the case with trauma surgery, Dr. Larkin, who will write recommendations and I discussed the case with Dr. Rodriguez who will evaluate the patient for admission.    The patient will be admitted in stable condition.  FINAL IMPRESSION  1. Closed fracture of multiple ribs of right side, initial encounter    2. Hypoxia

## 2020-01-04 NOTE — PROGRESS NOTES
Updated MD Robert of change in patient labs.   Order for redraw received.    Change of due time for mag citrate per MD. See Nursing communication.     7594- redraw resulted. MD Robert updated of positive increase in patient labs.   No new orders received. MD ok'd continuing with celebrex and tylenol per MAR.

## 2020-01-04 NOTE — PROGRESS NOTES
[facility] Medicine Daily Progress Note    Date of Service  1/4/2020    Chief Complaint  85 y.o. male admitted 1/3/2020 with pleural effusion and multiple right-sided rib fractures following ground-level fall    Hospital Course    This is a 85 y.o. male who was transferred from [facility] after sustaining a fall over 2 days prior to presentation.  A CT was performed and revealed a small right effusion and 3 posterior right lower rib fractures.  The patient was sent to [facility] for further management      Interval Problem Update  Transaminitis - stable, possibly r/t effusion; began shortly after admission  CT with multiple rib fractures and with significant constipation  TSH normal  Alert and oriented x4  Says he tripped over his pants when he was attempting to put these on at home  Follows with cardiology due to CAD history as well as history of CABG  Has not been on anticoagulants for years  H&H stable  Transaminitis  Ammonia normal  Tylenol level normal  D-dimer elevated slightly    Consultants/Specialty  Trauma surgery    Code Status  Full    Disposition  TBD    Review of Systems  Review of Systems   Constitutional: Negative for chills, diaphoresis, fever, malaise/fatigue and weight loss.   Respiratory: Negative for cough, hemoptysis, sputum production, shortness of breath and wheezing.    Cardiovascular: Positive for chest pain. Negative for palpitations, orthopnea, claudication, leg swelling and PND.   Gastrointestinal: Positive for abdominal pain. Negative for blood in stool, diarrhea, melena, nausea and vomiting.   Genitourinary: Negative for dysuria, frequency and urgency.   Musculoskeletal: Positive for myalgias.   All other systems reviewed and are negative.       Physical Exam  Temp:  [36.6 °C (97.8 °F)-37.1 °C (98.8 °F)] 37.1 °C (98.8 °F)  Pulse:  [86-94] 88  Resp:  [15-22] 18  BP: ()/(64-78) 99/66  SpO2:  [90 %-95 %] 94 %    Physical Exam  Vitals signs and nursing note reviewed.    HENT:      Head: Normocephalic and atraumatic.      Nose: Nose normal.   Eyes:      Conjunctiva/sclera: Conjunctivae normal.      Pupils: Pupils are equal, round, and reactive to light.   Neck:      Musculoskeletal: Neck supple.   Cardiovascular:      Rate and Rhythm: Normal rate and regular rhythm.      Heart sounds: No murmur. No friction rub.   Pulmonary:      Effort: No respiratory distress.   Abdominal:      General: Abdomen is protuberant. Bowel sounds are normal. There is no distension.      Palpations: Abdomen is soft.   Skin:     General: Skin is warm and dry.      Comments: Large well healed longitudinal sternotomy scar   Neurological:      Mental Status: He is alert.         Fluids    Intake/Output Summary (Last 24 hours) at 1/4/2020 0750  Last data filed at 1/4/2020 0318  Gross per 24 hour   Intake 0 ml   Output 0 ml   Net 0 ml       Laboratory  Recent Labs     01/03/20 2138 01/04/20 0315 01/04/20  0503   WBC 6.7 6.9 6.4   RBC 3.81* 3.69* 3.70*   HEMOGLOBIN 12.2* 11.9* 11.8*   HEMATOCRIT 37.1* 35.6* 35.7*   MCV 97.4 96.5 96.5   MCH 32.0 32.2 31.9   MCHC 32.9* 33.4* 33.1*   RDW 47.2 45.8 46.5   PLATELETCT 193 175 174   MPV 10.4 10.3 10.3     Recent Labs     01/03/20 2138 01/04/20  0315 01/04/20  0503   SODIUM 133* 132* 132*   POTASSIUM 4.2 4.1 4.0   CHLORIDE 99 101 102   CO2 22 22 22   GLUCOSE 153* 160* 156*   BUN 20 22 22   CREATININE 1.05 0.98 0.98   CALCIUM 8.2* 8.3* 8.1*     Recent Labs     01/03/20  0230   INR 1.00               Imaging  DX-CHEST-PORTABLE (1 VIEW)   Final Result      Persistent right basilar opacity without significant change.      [id number] VIEW   Final Result      Nonspecific generalized gaseous distention of colon and small bowel.      OUTSIDE IMAGES-CT CHEST   Final Result      OUTSIDE IMAGES-DX CHEST   Final Result      US-RUQ    (Results Pending)        Assessment/Plan  * Fracture of multiple ribs- (present on admission)  Assessment & Plan  Right sided multiple rib  fractures due to ground level fall   Associated pleural effusion,   Monitor blood counts closely   Serial xrays, pain control multimodal  Encourage IS  Early ambulation     Pleural effusion- (present on admission)  Assessment & Plan  Monitor effusion closely, serial xray evaluations      Encounter for geriatric assessment- (present on admission)  Assessment & Plan  PT and OT    Anemia  Assessment & Plan  No evidence of acute bleeding but monitor closely with rib fractures and pleural effusion     Constipation  Assessment & Plan  Mag citrate, fleets enema, usual bowel regimen  Added lactulose  Simethicone   Anti emetics   IVF  Constipation is chronic and lifelong; says he is been managing with as needed rectal suppositories of unknown type    HTN (hypertension)  Assessment & Plan  Resume home BB     CAD (coronary artery disease)  Assessment & Plan  Resume home BB, statin therapy not on asa     History of pulmonary embolism  Assessment & Plan  Diagnosed in April 2016 along with DVT of the leg  Discharge at that time on warfarin but 6 months later patient reported to an ER provider that he had not been taking this  A CT-CTA chest October 2016 showed no evidence of residual PE  INR on diagnostics in October 2016 and again here showed the patient has not been taking VKA  Dimer slightly elevated  Doppler LE  Consider CTA PA    BPH (benign prostatic hyperplasia)  Assessment & Plan  On flomax     Acute hypoxemic respiratory failure (HCC)  Assessment & Plan  Due to rib fractures and pleural effusion   Wean 02 as tolerated  Encourage IS    Fall from ground level- (present on admission)  Assessment & Plan  Trauma consulted       VTE prophylaxis: SCDs

## 2020-01-04 NOTE — PROGRESS NOTES
Patient here from ED via transport.   Patient currently refusing ambulation due to pain.  Patient states the pain comes and goes and declines current interventions.   Patient transfered to bed via slide board.     2 RN skin check  Bruising noted to right flank. Small scattered abrasions throughout.   All other skin intact.

## 2020-01-04 NOTE — ED NOTES
Med Rec Updated and Complete per Historical  Allergies Reviewed  No PO ABX last 14 days.    Pt does not appear to be on Warfarin at this time.  PharmD notified.    Pt cannot tell this writer when he last took medication at home.

## 2020-01-04 NOTE — ASSESSMENT & PLAN NOTE
Mild right sided pleural effusion.   No chest tube at this time  Supplemental oxygen to maintain O2 saturations greater than 95%  Aggressive pulmonary hygiene. Serial chest radiographs.

## 2020-01-04 NOTE — ED TRIAGE NOTES
"Chief Complaint   Patient presents with   • Rib Pain     known 8, 9, 10 right rib fractures with right pleural effusion     Pt transferred from [facility] for right \"hairline\" 8, 9, 10 rib fractures with small right pleural effusion.     Pt was discharged from [facility] two days ago after admission for small bowel obstruction. Pt fell while getting dressed, no acute changes on xray at the time. Pt returned to [facility] today for worsening right chest pain with slight SOB. Repeat imaging completed.    Pt satting 85% on room air at [facility], placed on 2L nasal cannula. Pt given 4mg morphine at 2100, 15mg toradol at 1730.    /78   Pulse 94   Temp 36.6 °C (97.9 °F) (Temporal)   Resp (!) 22   Ht 1.854 m (6' 1\")   Wt 102.1 kg (225 lb)   SpO2 90%   BMI 29.69 kg/m²     "

## 2020-01-04 NOTE — H&P
Hospital Medicine History & Physical Note    Date of Service  1/3/2020    Primary Care Physician  MAGDY Phillips    Consultants  Trauma    Code Status  full    Chief Complaint  Right sided pain, nausea     History of Presenting Illness  85 y.o. male who presented 1/3/2020 with past medical history of CABG, coronary artery disease, BPH, history of pulmonary embolism DVT on anticoagulation who presents with right-sided chest pain.  This patient was recently admitted to [facility] where he was found to have a small bowel obstruction.  He was discharged few days ago.  Just prior to discharge he had fell on his right side.  He subsequently has been having right-sided pain since that fall.  Pain is associated with some shortness of breath and worse with deep inspiration.  Right posterior and flank pain.  Otherwise no known alleviating or exacerbating factors to his symptoms.  He is found to have new oxygen requirement associated right-sided pleural effusion and new multiple rib fractures.  He will be admitted to the hospital with trauma surgery consultation.    Upon review of outside hospital records patient had WBC count 10.4 hemoglobin 12.6 platelet count 208 sodium 135 potassium 4.2 chloride 102 CO2 23 anion gap 15 glucose 159 BUN 17 creatinine 1.2 LFTs unremarkable INR 1.00 CT of the chest right lower lobe pleural effusion and atelectasis.  The ERP at outside facility reviewed the images and feels there is a rib fracture ninth and 10th ribs after ground-level fall.      Review of Systems  Review of Systems   Constitutional: Positive for malaise/fatigue. Negative for chills and fever.   HENT: Negative for congestion, hearing loss and tinnitus.    Eyes: Negative for blurred vision, double vision and discharge.   Respiratory: Positive for shortness of breath. Negative for cough and hemoptysis.    Cardiovascular: Positive for chest pain. Negative for palpitations and leg swelling.   Gastrointestinal:  Positive for abdominal pain, constipation and nausea. Negative for heartburn and vomiting.   Genitourinary: Positive for flank pain. Negative for dysuria.   Musculoskeletal: Negative for joint pain and myalgias.   Skin: Negative for rash.   Neurological: Negative for dizziness, sensory change, speech change, focal weakness and weakness.   Endo/Heme/Allergies: Negative for environmental allergies. Does not bruise/bleed easily.   Psychiatric/Behavioral: Negative for depression, hallucinations and substance abuse.       Past Medical History   has a past medical history of Cancer (HCC), Cataract, Myocardial infarct (HCC), and S/P CABG x 3.    Surgical History   has a past surgical history that includes hernia repair and pr cholecystoenterostomy.     Family History  Reviewed and not pertinent     Social History   reports that he has never smoked. He has never used smokeless tobacco. He reports current alcohol use. He reports that he does not use drugs.    Allergies  No Known Allergies    Medications  Prior to Admission Medications   Prescriptions Last Dose Informant Patient Reported? Taking?   amitriptyline (ELAVIL) 25 MG Tab  Patient Yes No   Sig: Take 25 mg by mouth every evening.   aspirin EC (ECOTRIN) 81 MG TBEC  Patient Yes No   Sig: Take 81 mg by mouth every day.   esomeprazole (NEXIUM) 40 MG delayed-release capsule  Patient Yes No   Sig: Take 40 mg by mouth every morning before breakfast.   metformin (GLUCOPHAGE) 500 MG Tab  Patient Yes No   Sig: Take 500 mg by mouth 2 times a day, with meals.   metoprolol (LOPRESSOR) 25 MG Tab   Yes No   Sig: every day. Indications: Pt does not know dose   pravastatin (PRAVACHOL) 20 MG TABS  Patient Yes No   Sig: Take 40 mg by mouth every evening.   tamsulosin (FLOMAX) 0.4 MG capsule  Patient Yes No   Sig: Take 0.4 mg by mouth ONE-HALF HOUR AFTER BREAKFAST.   warfarin (COUMADIN) 7.5 MG Tab  Patient No No   Sig: Take 1 Tab by mouth COUMADIN-DAILY.      Facility-Administered  Medications: None       Physical Exam  Temp:  [36.6 °C (97.9 °F)-36.9 °C (98.5 °F)] 36.6 °C (97.9 °F)  Pulse:  [85-94] 94  Resp:  [15-22] 15  BP: (120-143)/(70-83) 122/78  SpO2:  [83 %-94 %] 93 %    Physical Exam  Vitals signs reviewed.   Constitutional:       General: He is not in acute distress.     Appearance: He is ill-appearing.   HENT:      Head: Normocephalic and atraumatic.      Nose: No congestion.      Mouth/Throat:      Mouth: Mucous membranes are moist.   Eyes:      Extraocular Movements: Extraocular movements intact.      Pupils: Pupils are equal, round, and reactive to light.   Neck:      Musculoskeletal: Neck supple.   Cardiovascular:      Rate and Rhythm: Normal rate and regular rhythm.      Pulses: Normal pulses.      Heart sounds: Normal heart sounds.   Pulmonary:      Effort: No respiratory distress.      Breath sounds: No wheezing.      Comments: Diminished breath sounds bilaterally   Abdominal:      General: Bowel sounds are normal. There is distension.      Palpations: Abdomen is soft.      Tenderness: There is tenderness.   Musculoskeletal:         General: No swelling.      Comments: Right sided chest brusing around ribs    Skin:     General: Skin is warm and dry.      Capillary Refill: Capillary refill takes less than 2 seconds.   Neurological:      General: No focal deficit present.      Mental Status: He is alert and oriented to person, place, and time. Mental status is at baseline.   Psychiatric:         Mood and Affect: Mood normal.         Behavior: Behavior normal.         Thought Content: Thought content normal.         Judgment: Judgment normal.         Laboratory:  Recent Labs     01/03/20  0230   WBC 10.4   RBC 3.93*   HEMOGLOBIN 12.6*   HEMATOCRIT 37.3*   MCV 94.9*   MCH 32.1*   MCHC 33.8   RDW 12.8   PLATELETCT 208   MPV 10.6*     Recent Labs     01/03/20  0230   SODIUM 135*   POTASSIUM 4.2   CHLORIDE 101   CO2 23   GLUCOSE 159*   BUN 17   CREATININE 1.2   CALCIUM 8.3*     Recent  Labs     01/03/20  0230   ALTSGPT 25   ASTSGOT 26   ALKPHOSPHAT 90   TBILIRUBIN 1.0   GLUCOSE 159*     Recent Labs     01/03/20  0230   INR 1.00     No results for input(s): NTPROBNP in the last 72 hours.      No results for input(s): TROPONINT in the last 72 hours.    Urinalysis:    No results found     Imaging:  [id number] VIEW   Final Result      Nonspecific generalized gaseous distention of colon and small bowel.      OUTSIDE IMAGES-CT CHEST   Final Result      OUTSIDE IMAGES-DX CHEST   Final Result      DX-CHEST-PORTABLE (1 VIEW)    (Results Pending)         Assessment/Plan:  I anticipate this patient will require at least two midnights for appropriate medical management, necessitating inpatient admission.    * Fracture of multiple ribs- (present on admission)  Assessment & Plan  Right sided multiple rib fractures due to ground level fall   Associated pleural effusion, hold home coumadin and monitor blood counts closely   seiral xrays, pain control multimodal  Encourage IS  Early ambulation     Pleural effusion- (present on admission)  Assessment & Plan  Monitor effusion closely, serial xray evaluations  Hold home coumadin for now     Anemia  Assessment & Plan  No evidence of acute bleeding but monitor closely as patient on coumadin with rib fractures and pleural effusion     Constipation  Assessment & Plan  Stat KUB to r/o obstruction   May need enema and bowel regimine   Simethicone   Anti emetics   IVF    HTN (hypertension)  Assessment & Plan  Resume home BB     CAD (coronary artery disease)  Assessment & Plan  Resume home BB, statin therapy not on asa     History of pulmonary embolism  Assessment & Plan  On coumadin but will hold for now, monitor and restart as appropriate     BPH (benign prostatic hyperplasia)  Assessment & Plan  On flomax     Acute hypoxemic respiratory failure (HCC)  Assessment & Plan  Due to rib fractures and pleural effusion   Wean 02 as tolerated  Encourage IS    Fall from ground  level- (present on admission)  Assessment & Plan  Trauma consulted      VTE prophylaxis: scd    I spent a total of 31 minutes of non face to face time performing additional research, reviewing old medical records, provided on going management by following up on labs, placing orders, discussing plan of care with other healthcare providers and nursing staff. Start time: 11 pm. End time: 11:31 pm

## 2020-01-04 NOTE — CONSULTS
Surgical History and Physical    Date of Service: 1/3/2020    Requesting Physician: Cam Rodriguez MD - ER    PCP: MAGDY Phillips    Reason for Consult: Rib fractures    HPI: This is a 85 y.o. male who was transferred from [facility] after sustaining a fall over 2 days ago.  A CT was performed and revealed a small right effusion and 3 posterior right lower rib fractures.  The patient was sent to [facility] as there was no care practitioner at the facility with the expertise to manage highly complex acute problems such as these.      The patient was evaluated at bedside.  It was exceedingly difficult to interview the patient as he was frequently tangential and would not respond to my questions with direct answers.  He is able to tell me that his right chest wall hurts a lot.    PAST MEDICAL HISTORY:   Past Medical History:   Diagnosis Date   • Cancer (HCC)     skin   • Cataract     surgerically removed   • Myocardial infarct (HCC)    • S/P CABG x 3      Recurrent Small Bowel Obstructions    PAST SURGICAL HISTORY:   Past Surgical History:   Procedure Laterality Date   • HERNIA REPAIR     • PB CHOLECYSTOENTEROSTOMY            ALLERGIES: Patient has no known allergies.       CURRENT MEDICATIONS:   Please refer to the medication reconciliation in [facility]    FAMILY HISTORY:   Reviewed and found to be non-contributory in regards to the above presentation    SOCIAL HISTORY:  reports that he has never smoked. He has never used smokeless tobacco. He reports current alcohol use. He reports that he does not use drugs.  Patient denies habitual use of alcohol, tobacco, and illicit drugs  Patient has no known allergies.    Review of Systems:  Constitutional: Negative for fever, chills, weight loss, malaise/fatigue and diaphoresis.   HENT: Negative for hearing loss, ear pain, nosebleeds, congestion, sore throat, neck pain, tinnitus and ear discharge.    Eyes: Negative for blurred vision, double vision,  "photophobia, pain, discharge and redness.   Respiratory: Negative for cough, hemoptysis, sputum production, shortness of breath, wheezing and stridor.    Cardiovascular: Negative for chest pain, palpitations, orthopnea, claudication, leg swelling and PND.   Gastrointestinal: Negative for heartburn, nausea, vomiting, abdominal pain, diarrhea, constipation, blood in stool and melena.   Genitourinary: Negative for dysuria, urgency, frequency, hematuria and flank pain.   Musculoskeletal: Negative for myalgias, back pain, joint pain and falls.   Skin: Negative for itching and rash.  Neurological: Negative for dizziness, tingling, tremors, sensory change, speech change, focal weakness, seizures, loss of consciousness, weakness and headaches.   Endo/Heme/Allergies: Negative for environmental allergies and polydipsia. Does not bruise/bleed easily.   Psychiatric/Behavioral: Negative for depression, suicidal ideas, hallucinations, memory loss and substance abuse. The patient is not nervous/anxious and does not have insomnia.    Physical Exam:  /64   Pulse 92   Temp 36.6 °C (97.9 °F) (Temporal)   Resp 20   Ht 1.854 m (6' 1\")   Wt 102.1 kg (225 lb)   SpO2 93%   Vitals:    01/03/20 2300   BP: 117/64   Pulse: 92   Resp: 20   Temp:    SpO2: 93%     GENERAL:  The patient is healthy-appearing and in no acute distress  HEENT:  Atraumatic, normocephalic.  Normal pinna bilaterally.  External auditory canals are without discharge.  Conjunctivae and sclerae are clear. Extraocular movements are full. Pupils are equal, round, and reactive to light.  Oral mucosa is moist.  NECK:  Soft and supple without lymphadenopathy. No masses are noted.  Trachea is midline.  CHEST:  Lungs are clear to auscultation bilaterally.  No masses, lesions, or signs of trauma were noted.    CARDIOVASCULAR:  Regular rate and rhythm.  No murmurs appreciated.  No JVD.  Palpable pulses present in all four extremities.    ABDOMEN:  Soft, non-tender, " moderately distended.  Tympanitic.  No hepatomegaly or splenomegaly.  No incisional, umbilical, or inguinal hernias were appreciated.  Well healed vibha-umbilical incision is present.  GENITOURINARY:  The patient has normal external reproductive anatomy.  SKIN:  Warm and well perfused. No rashes.  NEUROLOGIC:  Alert and oriented. Cranial nerves II through XII are grossly intact. Motor and sensory exams are normal in all four extremities. Motor and sensory reflexes are 2+ and symmetric with bilateral plantar responses.  PSYCHIATRIC: Affect and mood is appropriate for age and condition.    Labs:  Recent Labs     01/03/20 0230 01/03/20 2138   WBC 10.4 6.7   RBC 3.93* 3.81*   HEMOGLOBIN 12.6* 12.2*   HEMATOCRIT 37.3* 37.1*   MCV 94.9* 97.4   MCH 32.1* 32.0   MCHC 33.8 32.9*   RDW 12.8 47.2   PLATELETCT 208 193   MPV 10.6* 10.4     Recent Labs     01/03/20 0230 01/03/20  2138   SODIUM 135* 133*   POTASSIUM 4.2 4.2   CHLORIDE 101 99   CO2 23 22   GLUCOSE 159* 153*   BUN 17 20   CREATININE 1.2 1.05   CALCIUM 8.3* 8.2*     Recent Labs     01/03/20 0230   INR 1.00     Recent Labs     01/03/20 0230 01/03/20  2138   ASTSGOT 26 70*   ALTSGPT 25 42   TBILIRUBIN 1.0 1.0   ALKPHOSPHAT 90 94   GLOBULIN  --  2.7   INR 1.00  --        Radiology:  OUTSIDE IMAGES-CT CHEST   Final Result      OUTSIDE IMAGES-DX CHEST   Final Result      [id number] VIEW    (Results Pending)   DX-CHEST-PORTABLE (1 VIEW)    (Results Pending)   All available images were reviewed personally.    Assessment:   Fracture of multiple ribs  Right 8th, 9th, and 10th rib fractures.  Aggressive multimodal pain management, and pulmonary hygiene.   Serial chest radiographs.    Pleural effusion  Mild right sided pleural effusion.   No chest tube at this time  Supplemental oxygen to maintain O2 saturations greater than 95%  Aggressive pulmonary hygiene. Serial chest radiographs.    Fall from ground level  Patient fell 48 hrs prior to presentation to [facility] ED.   Non  trauma activation.  Trauma consulting  Dustin Diego MD, Trauma     Encounter for geriatric assessment  Geriatric consultation requested upon admission.      Plan    Multi-modal pain regimen ordered  Daily chest x-ray x3 days  Incentive spirometry/Pulmonary toilet work  Would hold off on resuming Warfarin for now  ____________________________________   Garret WEST / ARLEN     DD: 1/3/2020   DT: 11:35 PM

## 2020-01-05 ENCOUNTER — APPOINTMENT (OUTPATIENT)
Dept: RADIOLOGY | Facility: MEDICAL CENTER | Age: 85
DRG: 183 | End: 2020-01-05
Attending: SURGERY
Payer: MEDICARE

## 2020-01-05 PROBLEM — R26.81 UNSTABLE GAIT: Status: ACTIVE | Noted: 2020-01-05

## 2020-01-05 PROBLEM — R41.0 DELIRIUM: Status: ACTIVE | Noted: 2020-01-05

## 2020-01-05 LAB
ERYTHROCYTE [DISTWIDTH] IN BLOOD BY AUTOMATED COUNT: 47.7 FL (ref 35.9–50)
HCT VFR BLD AUTO: 33.8 % (ref 42–52)
HGB BLD-MCNC: 11.2 G/DL (ref 14–18)
MCH RBC QN AUTO: 32.4 PG (ref 27–33)
MCHC RBC AUTO-ENTMCNC: 33.1 G/DL (ref 33.7–35.3)
MCV RBC AUTO: 97.7 FL (ref 81.4–97.8)
PLATELET # BLD AUTO: 186 K/UL (ref 164–446)
PMV BLD AUTO: 10.4 FL (ref 9–12.9)
RBC # BLD AUTO: 3.46 M/UL (ref 4.7–6.1)
WBC # BLD AUTO: 4.4 K/UL (ref 4.8–10.8)

## 2020-01-05 PROCEDURE — 770006 HCHG ROOM/CARE - MED/SURG/GYN SEMI*

## 2020-01-05 PROCEDURE — 700102 HCHG RX REV CODE 250 W/ 637 OVERRIDE(OP): Performed by: FAMILY MEDICINE

## 2020-01-05 PROCEDURE — 97166 OT EVAL MOD COMPLEX 45 MIN: CPT

## 2020-01-05 PROCEDURE — 700102 HCHG RX REV CODE 250 W/ 637 OVERRIDE(OP): Performed by: HOSPITALIST

## 2020-01-05 PROCEDURE — 99232 SBSQ HOSP IP/OBS MODERATE 35: CPT | Performed by: INTERNAL MEDICINE

## 2020-01-05 PROCEDURE — A9270 NON-COVERED ITEM OR SERVICE: HCPCS | Performed by: HOSPITALIST

## 2020-01-05 PROCEDURE — 85027 COMPLETE CBC AUTOMATED: CPT

## 2020-01-05 PROCEDURE — A9270 NON-COVERED ITEM OR SERVICE: HCPCS | Performed by: FAMILY MEDICINE

## 2020-01-05 PROCEDURE — 97162 PT EVAL MOD COMPLEX 30 MIN: CPT

## 2020-01-05 PROCEDURE — 700101 HCHG RX REV CODE 250: Performed by: SURGERY

## 2020-01-05 PROCEDURE — 36415 COLL VENOUS BLD VENIPUNCTURE: CPT

## 2020-01-05 PROCEDURE — A9270 NON-COVERED ITEM OR SERVICE: HCPCS | Performed by: NURSE PRACTITIONER

## 2020-01-05 PROCEDURE — 700102 HCHG RX REV CODE 250 W/ 637 OVERRIDE(OP): Performed by: NURSE PRACTITIONER

## 2020-01-05 PROCEDURE — 700102 HCHG RX REV CODE 250 W/ 637 OVERRIDE(OP): Performed by: SURGERY

## 2020-01-05 PROCEDURE — A9270 NON-COVERED ITEM OR SERVICE: HCPCS | Performed by: SURGERY

## 2020-01-05 PROCEDURE — 71045 X-RAY EXAM CHEST 1 VIEW: CPT

## 2020-01-05 RX ORDER — POLYETHYLENE GLYCOL 3350 17 G/17G
1 POWDER, FOR SOLUTION ORAL 2 TIMES DAILY
Status: DISCONTINUED | OUTPATIENT
Start: 2020-01-05 | End: 2020-01-10 | Stop reason: HOSPADM

## 2020-01-05 RX ORDER — AMOXICILLIN 250 MG
2 CAPSULE ORAL 2 TIMES DAILY
Status: DISCONTINUED | OUTPATIENT
Start: 2020-01-05 | End: 2020-01-10 | Stop reason: HOSPADM

## 2020-01-05 RX ADMIN — CELECOXIB 100 MG: 100 CAPSULE ORAL at 06:44

## 2020-01-05 RX ADMIN — SENNOSIDES AND DOCUSATE SODIUM 2 TABLET: 8.6; 5 TABLET ORAL at 06:44

## 2020-01-05 RX ADMIN — LIDOCAINE 1 PATCH: 50 PATCH TOPICAL at 00:17

## 2020-01-05 RX ADMIN — GABAPENTIN 100 MG: 100 CAPSULE ORAL at 06:44

## 2020-01-05 RX ADMIN — LACTULOSE 15 ML: 20 SOLUTION ORAL at 18:06

## 2020-01-05 RX ADMIN — LACTULOSE 15 ML: 20 SOLUTION ORAL at 06:43

## 2020-01-05 RX ADMIN — OMEPRAZOLE 20 MG: 20 CAPSULE, DELAYED RELEASE ORAL at 06:44

## 2020-01-05 RX ADMIN — AMITRIPTYLINE HYDROCHLORIDE 75 MG: 75 TABLET, FILM COATED ORAL at 22:37

## 2020-01-05 RX ADMIN — LACTULOSE 15 ML: 20 SOLUTION ORAL at 13:13

## 2020-01-05 RX ADMIN — MAGNESIUM HYDROXIDE 30 ML: 400 SUSPENSION ORAL at 09:20

## 2020-01-05 RX ADMIN — METOPROLOL SUCCINATE 25 MG: 25 TABLET, EXTENDED RELEASE ORAL at 06:44

## 2020-01-05 RX ADMIN — ASPIRIN 81 MG: 81 TABLET, COATED ORAL at 06:44

## 2020-01-05 RX ADMIN — POLYETHYLENE GLYCOL 3350 1 PACKET: 17 POWDER, FOR SOLUTION ORAL at 18:06

## 2020-01-05 RX ADMIN — POLYETHYLENE GLYCOL 3350 1 PACKET: 17 POWDER, FOR SOLUTION ORAL at 09:20

## 2020-01-05 RX ADMIN — LIDOCAINE 1 PATCH: 50 PATCH TOPICAL at 23:56

## 2020-01-05 RX ADMIN — SENNOSIDES AND DOCUSATE SODIUM 2 TABLET: 8.6; 5 TABLET ORAL at 18:06

## 2020-01-05 RX ADMIN — TAMSULOSIN HYDROCHLORIDE 0.4 MG: 0.4 CAPSULE ORAL at 09:20

## 2020-01-05 ASSESSMENT — ACTIVITIES OF DAILY LIVING (ADL): TOILETING: INDEPENDENT

## 2020-01-05 ASSESSMENT — COGNITIVE AND FUNCTIONAL STATUS - GENERAL
DRESSING REGULAR LOWER BODY CLOTHING: A LOT
TURNING FROM BACK TO SIDE WHILE IN FLAT BAD: UNABLE
HELP NEEDED FOR BATHING: A LOT
MOVING FROM LYING ON BACK TO SITTING ON SIDE OF FLAT BED: A LOT
MOVING TO AND FROM BED TO CHAIR: UNABLE
SUGGESTED CMS G CODE MODIFIER DAILY ACTIVITY: CK
WALKING IN HOSPITAL ROOM: A LITTLE
DRESSING REGULAR UPPER BODY CLOTHING: A LITTLE
STANDING UP FROM CHAIR USING ARMS: A LOT
SUGGESTED CMS G CODE MODIFIER MOBILITY: CL
MOBILITY SCORE: 11
CLIMB 3 TO 5 STEPS WITH RAILING: A LOT
PERSONAL GROOMING: A LITTLE
DAILY ACTIVITIY SCORE: 17
TOILETING: A LITTLE

## 2020-01-05 ASSESSMENT — GAIT ASSESSMENTS
ASSISTIVE DEVICE: FRONT WHEEL WALKER
DISTANCE (FEET): 150
GAIT LEVEL OF ASSIST: SUPERVISED

## 2020-01-05 ASSESSMENT — COPD QUESTIONNAIRES
DO YOU EVER COUGH UP ANY MUCUS OR PHLEGM?: NO/ONLY WITH OCCASIONAL COLDS OR INFECTIONS
HAVE YOU SMOKED AT LEAST 100 CIGARETTES IN YOUR ENTIRE LIFE: NO/DON'T KNOW
DURING THE PAST 4 WEEKS HOW MUCH DID YOU FEEL SHORT OF BREATH: NONE/LITTLE OF THE TIME

## 2020-01-05 ASSESSMENT — LIFESTYLE VARIABLES: EVER_SMOKED: NEVER

## 2020-01-05 NOTE — THERAPY
"Occupational Therapy Evaluation completed.   Functional Status:  Min A supine to sit with HOB raised.  Max A sock management.  Pt stood with min A and walked to bathroom w/FWW.  Pt attempted to urinate in toilet, standing with min A for safety/balance.  Pt walked hallway with FWW then left up in chair.  Min A to safely sit down in chair.  Plan of Care: Will benefit from Occupational Therapy 3 times per week  Discharge Recommendations:  Equipment: Will Continue to Assess for Equipment Needs. .Recommend post-acute placement for additional occupational therapy services prior to discharge home. Patient can tolerate post-acute therapies at a 5x/week frequency.    Pt is 84 y/o M seen for OT evaluation.  Pt was recently at a different facility for a small bowel obstruction where he fell, resulting in R sided rib fxs.  Pt admitted with hypoxia and pleural effusion.  Pt with hx of CABBG, CAD, and PE/DVT.  Pt currently presents with impaired strength, functional mobility, and activity tolerance; impacting self-care at this time.  Pt will continue to benefit from acute OT services.  Pt would also benefit from post acute placement prior to DC home.    See \"Rehab Therapy-Acute\" Patient Summary Report for complete documentation.    "

## 2020-01-05 NOTE — PROGRESS NOTES
Trauma / Surgical Daily Progress Note    Date of Service  1/5/2020    Interval Events  CXR stable  Vitals and labs normal  Small BM, bowel regimen diversified by primary team  OK to advance diet as tolerated from my perspective    Review of Systems  ROS     Vital Signs  Temp:  [36.3 °C (97.3 °F)-37.1 °C (98.8 °F)] 36.9 °C (98.4 °F)  Pulse:  [] 80  Resp:  [17-20] 17  BP: (103-131)/(64-82) 103/64  SpO2:  [92 %-96 %] 96 %    Physical Exam  Physical Exam  Vitals signs and nursing note reviewed.   Cardiovascular:      Rate and Rhythm: Normal rate and regular rhythm.      Pulses: Normal pulses. No decreased pulses.   Pulmonary:      Effort: Pulmonary effort is normal.      Breath sounds: Normal air entry. Examination of the right-lower field reveals decreased breath sounds. Decreased breath sounds present.   Abdominal:      General: There is distension.      Palpations: Abdomen is soft.      Tenderness: There is no tenderness.         Laboratory  Recent Results (from the past 24 hour(s))   CBC WITHOUT DIFFERENTIAL    Collection Time: 01/05/20  8:32 AM   Result Value Ref Range    WBC 4.4 (L) 4.8 - 10.8 K/uL    RBC 3.46 (L) 4.70 - 6.10 M/uL    Hemoglobin 11.2 (L) 14.0 - 18.0 g/dL    Hematocrit 33.8 (L) 42.0 - 52.0 %    MCV 97.7 81.4 - 97.8 fL    MCH 32.4 27.0 - 33.0 pg    MCHC 33.1 (L) 33.7 - 35.3 g/dL    RDW 47.7 35.9 - 50.0 fL    Platelet Count 186 164 - 446 K/uL    MPV 10.4 9.0 - 12.9 fL       Fluids    Intake/Output Summary (Last 24 hours) at 1/5/2020 1412  Last data filed at 1/5/2020 0315  Gross per 24 hour   Intake 600 ml   Output 1150 ml   Net -550 ml       Core Measures & Quality Metrics  Core Measures & Quality Metrics  GENE Score  ETOH Screening    Assessment/Plan  * Fracture of multiple ribs- (present on admission)  Assessment & Plan  Right 8th, 9th, and 10th rib fractures.  Aggressive multimodal pain management, and pulmonary hygiene.   Serial chest radiographs.    Pleural effusion- (present on  admission)  Assessment & Plan  Mild right sided pleural effusion.   No chest tube at this time  Supplemental oxygen to maintain O2 saturations greater than 95%  Aggressive pulmonary hygiene. Serial chest radiographs.    Encounter for geriatric assessment- (present on admission)  Assessment & Plan  Geriatric consultation requested upon admission.    Fall from ground level- (present on admission)  Assessment & Plan  Patient fell 48 hrs prior to presentation to [facility] ED.   Non trauma activation.  Trauma consulting  Dustin Diego MD, Trauma       Garret Diego MD

## 2020-01-05 NOTE — PROGRESS NOTES
Bedside report received.  Assessment complete.  A&O x 4. Patient calls appropriately.  Patient up with standby assist,with FWW.   Patient has 0/10 pain.   Denies N&V. Tolerating Full liquid diet.  + void, + flatus, + BM.  Patient exhibiting no signs of respiratory distress.  Patient pleasant with staff and resting in bed.  Review plan with of care with patient. Call light and personal belongings with in reach. Hourly rounding in place. All needs met at this time.

## 2020-01-05 NOTE — CARE PLAN
Problem: Communication  Goal: The ability to communicate needs accurately and effectively will improve  Outcome: PROGRESSING AS EXPECTED     Problem: Safety  Goal: Will remain free from injury  Outcome: PROGRESSING AS EXPECTED     Problem: Infection  Goal: Will remain free from infection  Outcome: PROGRESSING AS EXPECTED     Problem: Knowledge Deficit  Goal: Knowledge of disease process/condition, treatment plan, diagnostic tests, and medications will improve  Outcome: PROGRESSING AS EXPECTED     Problem: Pain Management  Goal: Pain level will decrease to patient's comfort goal  Outcome: PROGRESSING AS EXPECTED     Problem: Respiratory:  Goal: Respiratory status will improve  Outcome: PROGRESSING AS EXPECTED     Problem: Skin Integrity  Goal: Risk for impaired skin integrity will decrease  Outcome: PROGRESSING AS EXPECTED

## 2020-01-05 NOTE — PROGRESS NOTES
[facility] Medicine Daily Progress Note    Date of Service  1/5/2020    Chief Complaint  85 y.o. male admitted 1/3/2020 with pleural effusion and multiple right-sided rib fractures following ground-level fall    Hospital Course    This is a 85 y.o. male who was transferred from [facility] after sustaining a fall over 2 days prior to presentation.  A CT was performed and revealed a small right effusion and 3 posterior right lower rib fractures.  The patient was sent to [facility] for further management      Interval Problem Update  Patient was lethargic and confused when I woke him up at 9 AM today  H&H stable  Breathing is normal rate, nonlabored, without accessory sounds without any evidence of tachycardia or emboli  Venous DVT study is negative  RUQ ultrasound reviewed and negative for acute pathology  Await PT and OT recommendations as nursing is having difficulty with ambulation  Only a small bowel movement yesterday    Consultants/Specialty  Trauma surgery    Code Status  Full    Disposition  TBD    Review of Systems  Review of Systems   Unable to perform ROS: Mental acuity        Physical Exam  Temp:  [36.3 °C (97.3 °F)-37.1 °C (98.8 °F)] 36.9 °C (98.4 °F)  Pulse:  [] 80  Resp:  [17-20] 17  BP: (103-131)/(64-82) 103/64  SpO2:  [92 %-96 %] 96 %    Physical Exam  Vitals signs and nursing note reviewed.   HENT:      Head: Normocephalic and atraumatic.      Nose: Nose normal.   Eyes:      Conjunctiva/sclera: Conjunctivae normal.      Pupils: Pupils are equal, round, and reactive to light.   Neck:      Musculoskeletal: Neck supple.   Cardiovascular:      Rate and Rhythm: Normal rate and regular rhythm.      Heart sounds: No murmur. No friction rub.   Pulmonary:      Effort: No respiratory distress.   Abdominal:      General: Abdomen is protuberant. Bowel sounds are normal. There is no distension.      Palpations: Abdomen is soft.   Skin:     General: Skin is warm and dry.      Comments: Large well  healed longitudinal sternotomy scar   Neurological:      Mental Status: He is lethargic and confused.         Fluids    Intake/Output Summary (Last 24 hours) at 1/5/2020 1209  Last data filed at 1/5/2020 0315  Gross per 24 hour   Intake 600 ml   Output 1150 ml   Net -550 ml       Laboratory  Recent Labs     01/04/20  0315 01/04/20  0503 01/05/20  0832   WBC 6.9 6.4 4.4*   RBC 3.69* 3.70* 3.46*   HEMOGLOBIN 11.9* 11.8* 11.2*   HEMATOCRIT 35.6* 35.7* 33.8*   MCV 96.5 96.5 97.7   MCH 32.2 31.9 32.4   MCHC 33.4* 33.1* 33.1*   RDW 45.8 46.5 47.7   PLATELETCT 175 174 186   MPV 10.3 10.3 10.4     Recent Labs     01/03/20  2138 01/04/20  0315 01/04/20  0503   SODIUM 133* 132* 132*   POTASSIUM 4.2 4.1 4.0   CHLORIDE 99 101 102   CO2 22 22 22   GLUCOSE 153* 160* 156*   BUN 20 22 22   CREATININE 1.05 0.98 0.98   CALCIUM 8.2* 8.3* 8.1*     Recent Labs     01/03/20  0230   INR 1.00               Imaging  DX-CHEST-PORTABLE (1 VIEW)   Final Result      Stable bibasilar atelectasis.      US-RUQ   Final Result      1.  No acute sonographic abnormality. Cholecystectomy.   2.  Partially visualized right pleural effusion.      US-EXTREMITY VENOUS LOWER BILAT   Final Result      DX-CHEST-PORTABLE (1 VIEW)   Final Result      Persistent right basilar opacity without significant change.      [id number] VIEW   Final Result      Nonspecific generalized gaseous distention of colon and small bowel.      OUTSIDE IMAGES-CT CHEST   Final Result      OUTSIDE IMAGES-DX CHEST   Final Result           Assessment/Plan  * Fracture of multiple ribs- (present on admission)  Assessment & Plan  Right sided multiple rib fractures due to ground level fall   Associated pleural effusion,   Monitor blood counts closely   Serial xrays, pain control multimodal  Encourage IS  Early ambulation     Pleural effusion- (present on admission)  Assessment & Plan  Monitor effusion closely, serial xray evaluations      Delirium  Assessment & Plan  Stop gabapentin  Up to  chair with meals  PT and OT  Frequent reorientation  Avoid interruptions at night  Would benefit from a room with a window      Constipation  Assessment & Plan  Mag citrate, fleets enema, usual bowel regimen  Added lactulose  Simethicone   Anti emetics   IVF  Constipation is chronic and lifelong; says he is been managing with as needed rectal suppositories of unknown type    Encounter for geriatric assessment- (present on admission)  Assessment & Plan  PT and OT    Unstable gait  Assessment & Plan  PT and OT eval  May need placement    Anemia  Assessment & Plan  No evidence of acute bleeding but monitor closely with rib fractures and pleural effusion     HTN (hypertension)  Assessment & Plan  Resume home BB     CAD (coronary artery disease)  Assessment & Plan  Resume home BB, statin therapy not on asa     History of pulmonary embolism  Assessment & Plan  Diagnosed in April 2016 along with DVT of the leg  Discharge at that time on warfarin but 6 months later patient reported to an ER provider that he had not been taking this  A CT-CTA chest October 2016 showed no evidence of residual PE  INR on diagnostics in October 2016 and again here showed the patient has not been taking VKA  Dimer slightly elevated  Doppler LE negative  No clinical criteria for PE    BPH (benign prostatic hyperplasia)  Assessment & Plan  On flomax     Acute hypoxemic respiratory failure (HCC)  Assessment & Plan  Due to rib fractures and pleural effusion   Wean 02 as tolerated  Encourage IS    Fall from ground level- (present on admission)  Assessment & Plan  Trauma consulted       VTE prophylaxis: SCDs

## 2020-01-05 NOTE — THERAPY
"Physical Therapy Evaluation completed.   Bed Mobility:  Supine to Sit: Moderate Assist  Transfers: Sit to Stand: Minimal Assist  Gait: Level Of Assist: Supervised with Front-Wheel Walker       Plan of Care: Will benefit from Physical Therapy 3 times per week  Discharge Recommendations: Equipment: Will Continue to Assess for Equipment Needs. Post-acute therapy: Recommend post-acute placement for continued physical therapy services prior to discharge home. Patient can tolerate post-acute therapies at a 5x/week frequency.       See \"Rehab Therapy-Acute\" Patient Summary Report for complete documentation.    Patient is a pleasant 86 YO male that was admitted 1/3 with pleural effusion and R sided rib fractures after GLF at outside facility. PMHx significant for HTN, CAD, BPH, PE. He presented to PT with pain, impaired balance and coordination, and decreased activity tolerance which are limiting his ability to safely perform mobility at [facility]. He required mod A for bed mobility and reported pain with getting out of bed. He required min A for moving sitting to standing and demonstrated compensatory strategy with extra time to achieve full stand. Once standing he moved relatively well, he ambulated approximately 150ft with FWW and supervision. He reported he lives alone and no social supports in area. Recommend post acute placement given findings, PLOF, and no available social support. Will follow while in house.  "

## 2020-01-05 NOTE — PROGRESS NOTES
Patient is requiring 2+ assist with ambulation if at all.  When pivoting patient to bed, patient became increasingly weak and required max assistance to manipulate patient in bed.  VSS and no increase in oxygen demand.

## 2020-01-05 NOTE — ASSESSMENT & PLAN NOTE
Avoid benzodiazepines and anticholinergics  Frequent orientation  Avoid early morning labs  Avoid vital signs during sleep  Ambulate if possible

## 2020-01-06 ENCOUNTER — APPOINTMENT (OUTPATIENT)
Dept: RADIOLOGY | Facility: MEDICAL CENTER | Age: 85
DRG: 183 | End: 2020-01-06
Attending: SURGERY
Payer: MEDICARE

## 2020-01-06 PROBLEM — R53.81 DEBILITY: Status: ACTIVE | Noted: 2020-01-06

## 2020-01-06 PROBLEM — J98.11 ATELECTASIS: Status: ACTIVE | Noted: 2020-01-06

## 2020-01-06 LAB
ALBUMIN SERPL BCP-MCNC: 3.4 G/DL (ref 3.2–4.9)
ALBUMIN/GLOB SERPL: 1.3 G/DL
ALP SERPL-CCNC: 114 U/L (ref 30–99)
ALT SERPL-CCNC: 59 U/L (ref 2–50)
ANION GAP SERPL CALC-SCNC: 9 MMOL/L (ref 0–11.9)
AST SERPL-CCNC: 35 U/L (ref 12–45)
BILIRUB SERPL-MCNC: 0.7 MG/DL (ref 0.1–1.5)
BUN SERPL-MCNC: 19 MG/DL (ref 8–22)
CALCIUM SERPL-MCNC: 8.2 MG/DL (ref 8.5–10.5)
CHLORIDE SERPL-SCNC: 96 MMOL/L (ref 96–112)
CO2 SERPL-SCNC: 26 MMOL/L (ref 20–33)
CREAT SERPL-MCNC: 0.82 MG/DL (ref 0.5–1.4)
ERYTHROCYTE [DISTWIDTH] IN BLOOD BY AUTOMATED COUNT: 45.9 FL (ref 35.9–50)
GLOBULIN SER CALC-MCNC: 2.7 G/DL (ref 1.9–3.5)
GLUCOSE SERPL-MCNC: 131 MG/DL (ref 65–99)
HCT VFR BLD AUTO: 32.5 % (ref 42–52)
HGB BLD-MCNC: 10.9 G/DL (ref 14–18)
MCH RBC QN AUTO: 32.3 PG (ref 27–33)
MCHC RBC AUTO-ENTMCNC: 33.5 G/DL (ref 33.7–35.3)
MCV RBC AUTO: 96.4 FL (ref 81.4–97.8)
PLATELET # BLD AUTO: 178 K/UL (ref 164–446)
PMV BLD AUTO: 10.2 FL (ref 9–12.9)
POTASSIUM SERPL-SCNC: 4.1 MMOL/L (ref 3.6–5.5)
PROT SERPL-MCNC: 6.1 G/DL (ref 6–8.2)
RBC # BLD AUTO: 3.37 M/UL (ref 4.7–6.1)
SODIUM SERPL-SCNC: 131 MMOL/L (ref 135–145)
WBC # BLD AUTO: 6.2 K/UL (ref 4.8–10.8)

## 2020-01-06 PROCEDURE — 700102 HCHG RX REV CODE 250 W/ 637 OVERRIDE(OP): Performed by: SURGERY

## 2020-01-06 PROCEDURE — A9270 NON-COVERED ITEM OR SERVICE: HCPCS | Performed by: SURGERY

## 2020-01-06 PROCEDURE — 80053 COMPREHEN METABOLIC PANEL: CPT

## 2020-01-06 PROCEDURE — 36415 COLL VENOUS BLD VENIPUNCTURE: CPT

## 2020-01-06 PROCEDURE — 700102 HCHG RX REV CODE 250 W/ 637 OVERRIDE(OP): Performed by: HOSPITALIST

## 2020-01-06 PROCEDURE — A9270 NON-COVERED ITEM OR SERVICE: HCPCS | Performed by: NURSE PRACTITIONER

## 2020-01-06 PROCEDURE — 700101 HCHG RX REV CODE 250: Performed by: SURGERY

## 2020-01-06 PROCEDURE — 770006 HCHG ROOM/CARE - MED/SURG/GYN SEMI*

## 2020-01-06 PROCEDURE — A9270 NON-COVERED ITEM OR SERVICE: HCPCS | Performed by: FAMILY MEDICINE

## 2020-01-06 PROCEDURE — 700102 HCHG RX REV CODE 250 W/ 637 OVERRIDE(OP): Performed by: NURSE PRACTITIONER

## 2020-01-06 PROCEDURE — 99232 SBSQ HOSP IP/OBS MODERATE 35: CPT | Performed by: INTERNAL MEDICINE

## 2020-01-06 PROCEDURE — 700102 HCHG RX REV CODE 250 W/ 637 OVERRIDE(OP): Performed by: FAMILY MEDICINE

## 2020-01-06 PROCEDURE — A9270 NON-COVERED ITEM OR SERVICE: HCPCS | Performed by: HOSPITALIST

## 2020-01-06 PROCEDURE — 71045 X-RAY EXAM CHEST 1 VIEW: CPT

## 2020-01-06 PROCEDURE — 85027 COMPLETE CBC AUTOMATED: CPT

## 2020-01-06 RX ADMIN — LIDOCAINE 1 PATCH: 50 PATCH TOPICAL at 23:48

## 2020-01-06 RX ADMIN — OXYCODONE HYDROCHLORIDE 2.5 MG: 5 TABLET ORAL at 22:09

## 2020-01-06 RX ADMIN — SENNOSIDES AND DOCUSATE SODIUM 2 TABLET: 8.6; 5 TABLET ORAL at 06:03

## 2020-01-06 RX ADMIN — AMITRIPTYLINE HYDROCHLORIDE 75 MG: 75 TABLET, FILM COATED ORAL at 22:09

## 2020-01-06 RX ADMIN — POLYETHYLENE GLYCOL 3350 1 PACKET: 17 POWDER, FOR SOLUTION ORAL at 06:03

## 2020-01-06 RX ADMIN — MAGNESIUM HYDROXIDE 30 ML: 400 SUSPENSION ORAL at 06:04

## 2020-01-06 RX ADMIN — OMEPRAZOLE 20 MG: 20 CAPSULE, DELAYED RELEASE ORAL at 06:03

## 2020-01-06 RX ADMIN — TAMSULOSIN HYDROCHLORIDE 0.4 MG: 0.4 CAPSULE ORAL at 08:24

## 2020-01-06 RX ADMIN — ASPIRIN 81 MG: 81 TABLET, COATED ORAL at 06:03

## 2020-01-06 RX ADMIN — Medication 1 EACH: at 10:26

## 2020-01-06 RX ADMIN — METOPROLOL SUCCINATE 25 MG: 25 TABLET, EXTENDED RELEASE ORAL at 06:04

## 2020-01-06 RX ADMIN — CELECOXIB 100 MG: 100 CAPSULE ORAL at 06:03

## 2020-01-06 NOTE — CARE PLAN
Problem: Communication  Goal: The ability to communicate needs accurately and effectively will improve  Outcome: PROGRESSING AS EXPECTED     Patient able to communicate needs effectively to staff    Problem: Safety  Goal: Will remain free from falls  Outcome: PROGRESSING AS EXPECTED     Appropriate fall precautions in place

## 2020-01-06 NOTE — CARE PLAN
Problem: Hyperinflation:  Goal: Prevent or improve atelectasis  Outcome: PROGRESSING AS EXPECTED   IS QID. Pt achieving 1250 ml. 60% 1860. 3LPM n/c.

## 2020-01-06 NOTE — DISCHARGE PLANNING
Care Transition Team Assessment      Anticipated Discharge Disposition: SNF    Action: RN CM assessed pt at bedside and verified facesheet demographics.  Pt reports he lives in a 1 story home in [place].  He was previously independent with ADLs and IADLs and reports using no DME. Pt is retired, has prescription drug coverage, uses [facility] mail order pharmacy, and reports no financial barriers at this time. Pt's PCP is MORENA Sharp.     Barriers to Discharge: Medical clearance for transfer to SNF. Awaiting SNF acceptance.     Plan: Await SNF acceptance for transfer.     Information Source  Orientation : Oriented x 4  Information Given By: Patient  Informant's Name: Celsa  Who is responsible for making decisions for patient? : Patient    Readmission Evaluation  Is this a readmission?: No    Elopement Risk  Legal Hold: No  Ambulatory or Self Mobile in Wheelchair: Yes  Disoriented: No  Psychiatric Symptoms: None  History of Wandering: No  Elopement this Admit: No  Vocalizing Wanting to Leave: No  Displays Behaviors, Body Language Wanting to Leave: No-Not at Risk for Elopement  Elopement Risk: Not at Risk for Elopement    Interdisciplinary Discharge Planning  Does Admitting Nurse Feel This Could be a Complex Discharge?: No  Primary Care Physician: MORENA Sharp  Lives with - Patient's Self Care Capacity: Alone and Able to Care For Self  Patient or legal guardian wants to designate a caregiver (see row info): No  Support Systems: Friends / Neighbors  Housing / Facility: 1 Story House  Do You Take your Prescribed Medications Regularly: Yes  Able to Return to Previous ADL's: Yes  Mobility Issues: Yes  Prior Services: None  Patient Expects to be Discharged to:: SNF  Assistance Needed: Unknown at this Time  Durable Medical Equipment: Not Applicable    Discharge Preparedness  What is your plan after discharge?: Skilled nursing facility  What are your discharge supports?: Child  Prior Functional Level:  Ambulatory, Drives Self, Independent with Activities of Daily Living, Independent with Medication Management  Difficulity with ADLs: None  Difficulity with IADLs: None    Functional Assesment  Prior Functional Level: Ambulatory, Drives Self, Independent with Activities of Daily Living, Independent with Medication Management    Finances  Financial Barriers to Discharge: No  Prescription Coverage: Yes    Vision / Hearing Impairment  Vision Impairment : (Reading glasses)  Right Eye Vision: Impaired, Wears Glasses  Left Eye Vision: Impaired, Wears Glasses  Hearing Impairment : No              Domestic Abuse  Have you ever been the victim of abuse or violence?: No  Physical Abuse or Sexual Abuse: No  Verbal Abuse or Emotional Abuse: No  Possible Abuse Reported to:: Not Applicable         Discharge Risks or Barriers  Discharge risks or barriers?: No    Anticipated Discharge Information  Anticipated discharge disposition: SNF  Discharge Contact Phone Number: [phone]

## 2020-01-06 NOTE — DISCHARGE PLANNING
[facility] Rehabilitation Transitional Care Coordination     Referral from:  LEANN Adair    Facesheet indicates: [facility]    Potential Rehab Diagnosis: Debility    Chart review indicates patient has limited medical management and therapy needs to meet CMS criteria for IRF level care. l   Physiatry consultation denied per protocol.      GLF with rib fractures. Mild right-sided pleural effusion, supplemental oxygen. Hx bowel obstruction resolved prior admission. Limited medical management, limited therapy needs. Chart reflects limited discharge support.  Referral will not be forwarded for Physiatry consult per protocol.  Anticipate post acute care needs can be met at skilled nursing level care.     Thank you for the referral.

## 2020-01-06 NOTE — PROGRESS NOTES
Trauma / Surgical Daily Progress Note    Date of Service  1/6/2020    Interval Events  CXR, respiratory status stable  Vitals and labs normal  Small BM, bowel regimen diversified by primary team  Requesting distal bowel care, Fleet's ordered  No new abdominal pain, suspect present abdominal exam is due to stool load through out the colon  OK to advance diet as tolerated from my perspective    Discussed with Dr. Lizama, will sign off    Review of Systems  ROS     Vital Signs  Temp:  [36.9 °C (98.5 °F)-37.7 °C (99.9 °F)] 37.7 °C (99.9 °F)  Pulse:  [] 101  Resp:  [16-18] 18  BP: ()/(65-75) 123/75  SpO2:  [93 %-96 %] 93 %    Physical Exam  Physical Exam  Vitals signs and nursing note reviewed.   Cardiovascular:      Rate and Rhythm: Normal rate and regular rhythm.      Pulses: Normal pulses. No decreased pulses.   Pulmonary:      Effort: Pulmonary effort is normal.      Breath sounds: Normal air entry. Examination of the right-lower field reveals decreased breath sounds. Decreased breath sounds present.   Abdominal:      General: There is distension.      Palpations: Abdomen is soft.      Tenderness: There is no tenderness.         Laboratory  Recent Results (from the past 24 hour(s))   CBC WITHOUT DIFFERENTIAL    Collection Time: 01/06/20  4:24 AM   Result Value Ref Range    WBC 6.2 4.8 - 10.8 K/uL    RBC 3.37 (L) 4.70 - 6.10 M/uL    Hemoglobin 10.9 (L) 14.0 - 18.0 g/dL    Hematocrit 32.5 (L) 42.0 - 52.0 %    MCV 96.4 81.4 - 97.8 fL    MCH 32.3 27.0 - 33.0 pg    MCHC 33.5 (L) 33.7 - 35.3 g/dL    RDW 45.9 35.9 - 50.0 fL    Platelet Count 178 164 - 446 K/uL    MPV 10.2 9.0 - 12.9 fL   Comp Metabolic Panel    Collection Time: 01/06/20  4:24 AM   Result Value Ref Range    Sodium 131 (L) 135 - 145 mmol/L    Potassium 4.1 3.6 - 5.5 mmol/L    Chloride 96 96 - 112 mmol/L    Co2 26 20 - 33 mmol/L    Glucose 131 (H) 65 - 99 mg/dL    Bun 19 8 - 22 mg/dL    Creatinine 0.82 0.50 - 1.40 mg/dL    Calcium 8.2 (L) 8.5 -  10.5 mg/dL    Anion Gap 9.0 0.0 - 11.9    AST(SGOT) 35 12 - 45 U/L    ALT(SGPT) 59 (H) 2 - 50 U/L    Alkaline Phosphatase 114 (H) 30 - 99 U/L    Total Bilirubin 0.7 0.1 - 1.5 mg/dL    Albumin 3.4 3.2 - 4.9 g/dL    Total Protein 6.1 6.0 - 8.2 g/dL    Globulin 2.7 1.9 - 3.5 g/dL    A-G Ratio 1.3 g/dL   ESTIMATED GFR    Collection Time: 01/06/20  4:24 AM   Result Value Ref Range    GFR If African American >60 >60 mL/min/1.73 m 2    GFR If Non African American >60 >60 mL/min/1.73 m 2       Fluids    Intake/Output Summary (Last 24 hours) at 1/6/2020 0905  Last data filed at 1/6/2020 0416  Gross per 24 hour   Intake 240 ml   Output 600 ml   Net -360 ml       Core Measures & Quality Metrics  Core Measures & Quality Metrics  GENE Score  ETOH Screening    Assessment/Plan  * Fracture of multiple ribs- (present on admission)  Assessment & Plan  Right 8th, 9th, and 10th rib fractures.  Aggressive multimodal pain management, and pulmonary hygiene.   Serial chest radiographs.    Pleural effusion- (present on admission)  Assessment & Plan  Mild right sided pleural effusion.   No chest tube at this time  Supplemental oxygen to maintain O2 saturations greater than 95%  Aggressive pulmonary hygiene. Serial chest radiographs.    Encounter for geriatric assessment- (present on admission)  Assessment & Plan  Geriatric consultation requested upon admission.    Fall from ground level- (present on admission)  Assessment & Plan  Patient fell 48 hrs prior to presentation to [facility].   Non trauma activation.  Trauma consulting  Dustin Diego MD, Trauma       Garret Diego MD

## 2020-01-06 NOTE — PROGRESS NOTES
Patient had x1 bowel movement after Milk of mag, Miralax and lactulose. Patients abdomen very firm and distended. Hypoactive bowel sounds. Patient had recent diagnosis of bowel obstruction. Paging MD to clarify if he would like cat scan of abdomen to see if bowel obstruction has reoccured.

## 2020-01-06 NOTE — DISCHARGE PLANNING
Received Choice form at 0015  Agency/Facility Name: [facility]  Referral sent per Choice form @ 0749

## 2020-01-06 NOTE — CARE PLAN
Problem: Discharge Barriers/Planning  Goal: Patient's continuum of care needs will be met  Outcome: PROGRESSING AS EXPECTED   Toelrating diet, BM, and good pulmonary hygiene

## 2020-01-06 NOTE — PROGRESS NOTES
[facility] Medicine Daily Progress Note    Date of Service  1/6/2020    Chief Complaint  85 y.o. male admitted 1/3/2020 with pleural effusion and multiple right-sided rib fractures following ground-level fall    Hospital Course    This is a 85 y.o. male who was transferred from [facility] after sustaining a fall over 2 days prior to presentation.  A CT was performed and revealed a small right effusion and 3 posterior right lower rib fractures.  The patient was sent to [facility] for further management      Interval Problem Update  A/O x 4, cooperative, appropriate, but intermittently confused  Sitting in chair  IS w low volume  3L NC 94%, Tmax 99.9, P90, 19, 133/62,   Atelec bilat on CXR  PT/OT recommend TCF  A few more sm BMs  Abd quiet, protuberant; NT, no N/V    Consultants/Specialty  Trauma surgery    Code Status  Full    Disposition  SNF vs Rehab prior to home    Review of Systems  Review of Systems   Unable to perform ROS: Mental acuity        Physical Exam  Temp:  [36.9 °C (98.5 °F)-37.7 °C (99.9 °F)] 36.9 °C (98.5 °F)  Pulse:  [] 90  Resp:  [16-19] 19  BP: ()/(62-75) 133/62  SpO2:  [93 %-96 %] 94 %    Physical Exam  Vitals signs and nursing note reviewed.   HENT:      Head: Normocephalic and atraumatic.      Nose: Nose normal.   Eyes:      Conjunctiva/sclera: Conjunctivae normal.      Pupils: Pupils are equal, round, and reactive to light.   Neck:      Musculoskeletal: Neck supple.   Cardiovascular:      Rate and Rhythm: Normal rate and regular rhythm.      Heart sounds: No murmur. No friction rub.   Pulmonary:      Effort: No respiratory distress.   Abdominal:      General: Abdomen is protuberant. Bowel sounds are decreased. There is no distension.      Palpations: Abdomen is soft.   Skin:     General: Skin is warm and dry.      Comments: Large well healed longitudinal sternotomy scar; old bruising note far lateral R chest wall   Neurological:      Mental Status: He is lethargic and  confused.         Fluids    Intake/Output Summary (Last 24 hours) at 1/6/2020 1047  Last data filed at 1/6/2020 0416  Gross per 24 hour   Intake 240 ml   Output 600 ml   Net -360 ml       Laboratory  Recent Labs     01/04/20  0503 01/05/20  0832 01/06/20  0424   WBC 6.4 4.4* 6.2   RBC 3.70* 3.46* 3.37*   HEMOGLOBIN 11.8* 11.2* 10.9*   HEMATOCRIT 35.7* 33.8* 32.5*   MCV 96.5 97.7 96.4   MCH 31.9 32.4 32.3   MCHC 33.1* 33.1* 33.5*   RDW 46.5 47.7 45.9   PLATELETCT 174 186 178   MPV 10.3 10.4 10.2     Recent Labs     01/04/20  0315 01/04/20  0503 01/06/20  0424   SODIUM 132* 132* 131*   POTASSIUM 4.1 4.0 4.1   CHLORIDE 101 102 96   CO2 22 22 26   GLUCOSE 160* 156* 131*   BUN 22 22 19   CREATININE 0.98 0.98 0.82   CALCIUM 8.3* 8.1* 8.2*                   Imaging  DX-CHEST-PORTABLE (1 VIEW)   Final Result      Stable bibasilar atelectasis, right greater than left.      DX-CHEST-PORTABLE (1 VIEW)   Final Result      Stable bibasilar atelectasis.      US-RUQ   Final Result      1.  No acute sonographic abnormality. Cholecystectomy.   2.  Partially visualized right pleural effusion.      US-EXTREMITY VENOUS LOWER BILAT   Final Result      DX-CHEST-PORTABLE (1 VIEW)   Final Result      Persistent right basilar opacity without significant change.      [id number] VIEW   Final Result      Nonspecific generalized gaseous distention of colon and small bowel.      OUTSIDE IMAGES-CT CHEST   Final Result      OUTSIDE IMAGES-DX CHEST   Final Result           Assessment/Plan  * Fracture of multiple ribs- (present on admission)  Assessment & Plan  Right sided multiple rib fractures due to ground level fall   Associated pleural effusion,   Monitor blood counts closely   Serial xrays, pain control multimodal  Encourage IS  Early ambulation     Pleural effusion- (present on admission)  Assessment & Plan  Monitor effusion closely, serial xray evaluations      Constipation  Assessment & Plan  Mag citrate, fleets enema, usual bowel  regimen; 1/6 pink lady enema  Added lactulose  Simethicone   Anti emetics   IVF  Constipation is chronic and lifelong; says he is been managing with as needed rectal suppositories of unknown type    Encounter for geriatric assessment- (present on admission)  Assessment & Plan  PT and OT    Debility  Assessment & Plan  Needs ongoing PT/OT    Atelectasis  Assessment & Plan  IS hourly  Pulm toilet per nursing    Unstable gait  Assessment & Plan  PT and OT eval  Placement  Referred to SNF vs Rehab    Anemia  Assessment & Plan  No evidence of acute bleeding but monitor closely with rib fractures and pleural effusion     HTN (hypertension)  Assessment & Plan  Resume home BB     CAD (coronary artery disease)  Assessment & Plan  Resume home BB, statin therapy not on asa     History of pulmonary embolism  Assessment & Plan  Diagnosed in April 2016 along with DVT of the leg  Discharge at that time on warfarin but 6 months later patient reported to an ER provider that he had not been taking this  A CT-CTA chest October 2016 showed no evidence of residual PE  INR on diagnostics in October 2016 and again here showed the patient has not been taking VKA  Dimer slightly elevated  Doppler LE negative  No clinical criteria for PE    BPH (benign prostatic hyperplasia)  Assessment & Plan  On flomax     Acute hypoxemic respiratory failure (HCC)  Assessment & Plan  Due to rib fractures and pleural effusion   Wean 02 as tolerated  Encourage IS    Delirium  Assessment & Plan  ---RESOLVED---  Stop gabapentin  Up to chair with meals  PT and OT  Frequent reorientation  Avoid interruptions at night  Would benefit from a room with a window      Fall from ground level- (present on admission)  Assessment & Plan  Trauma consulted       VTE prophylaxis: SCDs

## 2020-01-06 NOTE — PROGRESS NOTES
Received report from day shift RN. Assumed patient care  AOx4  Denies pain at this time  3 L of O2 via nasal cannula  Pulling 1100 on IS  R sided bruising   No complaints of nausea at this time, full liquid diet  Ambulates x1 assist with FWW  Moderate fall risk, bed alarm on  Call light within reach  Bed locked and in low position  POC discussed with patient  All needs met at this time.

## 2020-01-07 PROBLEM — E11.9 TYPE 2 DIABETES MELLITUS WITHOUT COMPLICATION, WITHOUT LONG-TERM CURRENT USE OF INSULIN (HCC): Status: ACTIVE | Noted: 2020-01-07

## 2020-01-07 PROBLEM — E83.42 HYPOMAGNESEMIA: Status: ACTIVE | Noted: 2020-01-07

## 2020-01-07 PROBLEM — R29.6 FREQUENT FALLS: Status: ACTIVE | Noted: 2020-01-05

## 2020-01-07 PROBLEM — E87.1 HYPONATREMIA: Status: ACTIVE | Noted: 2020-01-07

## 2020-01-07 PROBLEM — R79.89 ELEVATED LFTS: Status: ACTIVE | Noted: 2020-01-07

## 2020-01-07 LAB
ALBUMIN SERPL BCP-MCNC: 3.4 G/DL (ref 3.2–4.9)
ALBUMIN/GLOB SERPL: 1.4 G/DL
ALP SERPL-CCNC: 112 U/L (ref 30–99)
ALT SERPL-CCNC: 45 U/L (ref 2–50)
ANION GAP SERPL CALC-SCNC: 6 MMOL/L (ref 0–11.9)
AST SERPL-CCNC: 35 U/L (ref 12–45)
BILIRUB SERPL-MCNC: 0.7 MG/DL (ref 0.1–1.5)
BUN SERPL-MCNC: 18 MG/DL (ref 8–22)
CALCIUM SERPL-MCNC: 8.1 MG/DL (ref 8.5–10.5)
CHLORIDE SERPL-SCNC: 97 MMOL/L (ref 96–112)
CO2 SERPL-SCNC: 28 MMOL/L (ref 20–33)
CREAT SERPL-MCNC: 0.9 MG/DL (ref 0.5–1.4)
ERYTHROCYTE [DISTWIDTH] IN BLOOD BY AUTOMATED COUNT: 46 FL (ref 35.9–50)
GLOBULIN SER CALC-MCNC: 2.5 G/DL (ref 1.9–3.5)
GLUCOSE SERPL-MCNC: 137 MG/DL (ref 65–99)
HCT VFR BLD AUTO: 33.8 % (ref 42–52)
HGB BLD-MCNC: 11.1 G/DL (ref 14–18)
MCH RBC QN AUTO: 31.6 PG (ref 27–33)
MCHC RBC AUTO-ENTMCNC: 32.8 G/DL (ref 33.7–35.3)
MCV RBC AUTO: 96.3 FL (ref 81.4–97.8)
PLATELET # BLD AUTO: 187 K/UL (ref 164–446)
PMV BLD AUTO: 10.2 FL (ref 9–12.9)
POTASSIUM SERPL-SCNC: 4.5 MMOL/L (ref 3.6–5.5)
PROT SERPL-MCNC: 5.9 G/DL (ref 6–8.2)
RBC # BLD AUTO: 3.51 M/UL (ref 4.7–6.1)
SODIUM SERPL-SCNC: 131 MMOL/L (ref 135–145)
WBC # BLD AUTO: 4.6 K/UL (ref 4.8–10.8)

## 2020-01-07 PROCEDURE — A9270 NON-COVERED ITEM OR SERVICE: HCPCS | Performed by: SURGERY

## 2020-01-07 PROCEDURE — 700102 HCHG RX REV CODE 250 W/ 637 OVERRIDE(OP): Performed by: FAMILY MEDICINE

## 2020-01-07 PROCEDURE — A9270 NON-COVERED ITEM OR SERVICE: HCPCS | Performed by: HOSPITALIST

## 2020-01-07 PROCEDURE — 80053 COMPREHEN METABOLIC PANEL: CPT

## 2020-01-07 PROCEDURE — 700102 HCHG RX REV CODE 250 W/ 637 OVERRIDE(OP): Performed by: NURSE PRACTITIONER

## 2020-01-07 PROCEDURE — 700102 HCHG RX REV CODE 250 W/ 637 OVERRIDE(OP): Performed by: HOSPITALIST

## 2020-01-07 PROCEDURE — 700102 HCHG RX REV CODE 250 W/ 637 OVERRIDE(OP): Performed by: SURGERY

## 2020-01-07 PROCEDURE — A9270 NON-COVERED ITEM OR SERVICE: HCPCS | Performed by: FAMILY MEDICINE

## 2020-01-07 PROCEDURE — A9270 NON-COVERED ITEM OR SERVICE: HCPCS | Performed by: NURSE PRACTITIONER

## 2020-01-07 PROCEDURE — 770006 HCHG ROOM/CARE - MED/SURG/GYN SEMI*

## 2020-01-07 PROCEDURE — 85027 COMPLETE CBC AUTOMATED: CPT

## 2020-01-07 PROCEDURE — 99232 SBSQ HOSP IP/OBS MODERATE 35: CPT | Performed by: FAMILY MEDICINE

## 2020-01-07 PROCEDURE — 36415 COLL VENOUS BLD VENIPUNCTURE: CPT

## 2020-01-07 RX ORDER — GUAIFENESIN 600 MG/1
600 TABLET, EXTENDED RELEASE ORAL EVERY 12 HOURS
Status: DISCONTINUED | OUTPATIENT
Start: 2020-01-07 | End: 2020-01-10 | Stop reason: HOSPADM

## 2020-01-07 RX ADMIN — GUAIFENESIN 600 MG: 600 TABLET, EXTENDED RELEASE ORAL at 13:02

## 2020-01-07 RX ADMIN — CELECOXIB 100 MG: 100 CAPSULE ORAL at 06:22

## 2020-01-07 RX ADMIN — ASPIRIN 81 MG: 81 TABLET, COATED ORAL at 06:22

## 2020-01-07 RX ADMIN — METOPROLOL SUCCINATE 25 MG: 25 TABLET, EXTENDED RELEASE ORAL at 06:22

## 2020-01-07 RX ADMIN — SENNOSIDES AND DOCUSATE SODIUM 2 TABLET: 8.6; 5 TABLET ORAL at 17:05

## 2020-01-07 RX ADMIN — LACTULOSE 15 ML: 20 SOLUTION ORAL at 17:06

## 2020-01-07 RX ADMIN — GUAIFENESIN 600 MG: 600 TABLET, EXTENDED RELEASE ORAL at 17:05

## 2020-01-07 RX ADMIN — LACTULOSE 15 ML: 20 SOLUTION ORAL at 13:02

## 2020-01-07 RX ADMIN — OMEPRAZOLE 20 MG: 20 CAPSULE, DELAYED RELEASE ORAL at 06:22

## 2020-01-07 RX ADMIN — TAMSULOSIN HYDROCHLORIDE 0.4 MG: 0.4 CAPSULE ORAL at 09:55

## 2020-01-07 RX ADMIN — AMITRIPTYLINE HYDROCHLORIDE 75 MG: 75 TABLET, FILM COATED ORAL at 21:06

## 2020-01-07 RX ADMIN — POLYETHYLENE GLYCOL 3350 1 PACKET: 17 POWDER, FOR SOLUTION ORAL at 17:05

## 2020-01-07 ASSESSMENT — ENCOUNTER SYMPTOMS
WHEEZING: 0
ABDOMINAL PAIN: 0
NECK PAIN: 0
FLANK PAIN: 0
HEADACHES: 0
CHILLS: 0
VOMITING: 0
DIZZINESS: 0
HEARTBURN: 0
CONSTIPATION: 1
NERVOUS/ANXIOUS: 0
SPEECH CHANGE: 0
FEVER: 0
COUGH: 1
DIAPHORESIS: 0
BACK PAIN: 0
FOCAL WEAKNESS: 0
SORE THROAT: 0
NAUSEA: 0
SENSORY CHANGE: 0
SHORTNESS OF BREATH: 0
WEAKNESS: 1
PALPITATIONS: 0
DIARRHEA: 0
BLURRED VISION: 0

## 2020-01-07 NOTE — DISCHARGE PLANNING
Agency/Facility Name: [facility]  Outcome: Referral resent via fax @ [phone]

## 2020-01-07 NOTE — PROGRESS NOTES
Received report from day shift RN. Assumed patient care  AOx4   Pain rated at 7/10, medicated per MAR  3 L of O2 via nasal cannula  No complaints of nausea at this time, 6 small meals regular diet  Up x1 with FWW  +void +flatus +BM (loose)  Bed alarm on  Call light within reach  Bed locked and in low position  POC discussed with patient  All needs met at this time

## 2020-01-07 NOTE — CARE PLAN
Problem: Bowel/Gastric:  Goal: Normal bowel function is maintained or improved  Outcome: PROGRESSING AS EXPECTED  Note:   Bowel protocol remains in use. Pt had a large BM yesterday 1/6 but abdomen is still rounded and pt reports discomfort.      Problem: Respiratory:  Goal: Respiratory status will improve  Outcome: PROGRESSING AS EXPECTED  Note:   Encouraged coughing and deep breathing. Pt using IS frequently, pulling [id number].

## 2020-01-07 NOTE — PROGRESS NOTES
[facility] Medicine Daily Progress Note    Date of Service  1/7/2020    Chief Complaint  85 y.o. male admitted 1/3/2020 with Rib fractures.    Hospital Course  Admitted with Rib fractures secondary to fall.    Interval Problem Update  Rib fracture - pain better controlled  Atelectasis - Tmax 100.4  HTN - sbp 140s  Diabetes - BS low 100s  Hyponatremia - 131    Consultants/Specialty  Surgery    Code Status  Full    Disposition  Rehab/SNF    Review of Systems  Review of Systems   Constitutional: Positive for malaise/fatigue. Negative for chills, diaphoresis and fever.   HENT: Negative for hearing loss and sore throat.    Eyes: Negative for blurred vision.   Respiratory: Positive for cough. Negative for shortness of breath and wheezing.    Cardiovascular: Positive for chest pain and leg swelling. Negative for palpitations.   Gastrointestinal: Positive for constipation. Negative for abdominal pain, diarrhea, heartburn, nausea and vomiting.   Genitourinary: Negative for dysuria, flank pain and hematuria.   Musculoskeletal: Negative for back pain and neck pain.   Skin: Negative for rash.   Neurological: Positive for weakness. Negative for dizziness, sensory change, speech change, focal weakness and headaches.   Psychiatric/Behavioral: The patient is not nervous/anxious.         Physical Exam  Temp:  [36.9 °C (98.4 °F)-38 °C (100.4 °F)] 36.9 °C (98.5 °F)  Pulse:  [84-98] 87  Resp:  [16-18] 18  BP: (116-156)/(69-90) 156/90  SpO2:  [94 %-98 %] 98 %    Physical Exam  HENT:      Head: Normocephalic and atraumatic.      Nose: No congestion.      Mouth/Throat:      Mouth: Mucous membranes are moist.   Eyes:      Conjunctiva/sclera: Conjunctivae normal.      Pupils: Pupils are equal, round, and reactive to light.   Neck:      Musculoskeletal: No muscular tenderness.   Cardiovascular:      Rate and Rhythm: Normal rate and regular rhythm.   Pulmonary:      Effort: Pulmonary effort is normal.      Breath sounds: Examination of the  left-lower field reveals decreased breath sounds. Decreased breath sounds present.   Abdominal:      General: Bowel sounds are normal. There is no distension.      Palpations: Abdomen is soft.      Tenderness: There is no tenderness. There is no guarding or rebound.   Musculoskeletal:      Right lower leg: Edema present.      Left lower leg: Edema present.   Lymphadenopathy:      Cervical: No cervical adenopathy.   Skin:     General: Skin is warm and dry.   Neurological:      General: No focal deficit present.      Mental Status: He is alert and oriented to person, place, and time.      Cranial Nerves: No cranial nerve deficit.         Fluids    Intake/Output Summary (Last 24 hours) at 1/7/2020 1408  Last data filed at 1/7/2020 1300  Gross per 24 hour   Intake --   Output 1125 ml   Net -1125 ml       Laboratory  Recent Labs     01/05/20  0832 01/06/20  0424 01/07/20  0430   WBC 4.4* 6.2 4.6*   RBC 3.46* 3.37* 3.51*   HEMOGLOBIN 11.2* 10.9* 11.1*   HEMATOCRIT 33.8* 32.5* 33.8*   MCV 97.7 96.4 96.3   MCH 32.4 32.3 31.6   MCHC 33.1* 33.5* 32.8*   RDW 47.7 45.9 46.0   PLATELETCT 186 178 187   MPV 10.4 10.2 10.2     Recent Labs     01/06/20  0424 01/07/20  0430   SODIUM 131* 131*   POTASSIUM 4.1 4.5   CHLORIDE 96 97   CO2 26 28   GLUCOSE 131* 137*   BUN 19 18   CREATININE 0.82 0.90   CALCIUM 8.2* 8.1*                   Imaging  DX-CHEST-PORTABLE (1 VIEW)   Final Result      Stable bibasilar atelectasis, right greater than left.      DX-CHEST-PORTABLE (1 VIEW)   Final Result      Stable bibasilar atelectasis.      US-RUQ   Final Result      1.  No acute sonographic abnormality. Cholecystectomy.   2.  Partially visualized right pleural effusion.      US-EXTREMITY VENOUS LOWER BILAT   Final Result      DX-CHEST-PORTABLE (1 VIEW)   Final Result      Persistent right basilar opacity without significant change.      [id number] VIEW   Final Result      Nonspecific generalized gaseous distention of colon and small bowel.       OUTSIDE IMAGES-CT CHEST   Final Result      OUTSIDE IMAGES-DX CHEST   Final Result           Assessment/Plan  * Fracture of multiple ribs- (present on admission)  Assessment & Plan  Pain control  Encourage IS  RT protocol    Atelectasis- (present on admission)  Assessment & Plan  Encourage IS  Ambulate  Check procalcitonin    Delirium- (present on admission)  Assessment & Plan  Avoid benzodiazepines and anticholinergics  Frequent orientation  Avoid early morning labs  Avoid vital signs during sleep  Ambulate if possible      Constipation- (present on admission)  Assessment & Plan  Bowel protocol    Acute hypoxemic respiratory failure (HCC)- (present on admission)  Assessment & Plan  Encourage IS  RT protocol    Hypomagnesemia- (present on admission)  Assessment & Plan  Recheck level    Type 2 diabetes mellitus without complication, without long-term current use of insulin (HCC)- (present on admission)  Assessment & Plan  Check hgba1c    Hyponatremia- (present on admission)  Assessment & Plan  Follow bmp    Elevated LFTs- (present on admission)  Assessment & Plan  Follow cmp    Frequent falls- (present on admission)  Assessment & Plan  PT and OT   Check Vitamin b12 and d levels    Anemia- (present on admission)  Assessment & Plan  Follow cbc    HTN (hypertension)- (present on admission)  Assessment & Plan  Metoprolol    CAD (coronary artery disease)- (present on admission)  Assessment & Plan  Metoprolol, Pravastatin, ASA    History of pulmonary embolism- (present on admission)  Assessment & Plan  History of 4/2016  Was not on anticoagulation    Pleural effusion- (present on admission)  Assessment & Plan  Watch fluid status      BPH (benign prostatic hyperplasia)- (present on admission)  Assessment & Plan  Flomax        VTE prophylaxis: SCD

## 2020-01-07 NOTE — CARE PLAN
Problem: Safety  Goal: Will remain free from falls  Outcome: PROGRESSING AS EXPECTED     Appropriate fall precautions in place    Problem: Bowel/Gastric:  Goal: Will not experience complications related to bowel motility  Outcome: PROGRESSING AS EXPECTED     Pt had large loose BM today

## 2020-01-07 NOTE — PROGRESS NOTES
Pt A&Ox4.  Denies pain at rest but c/o pain with movement or coughing. Encouraged use of IS, pulls 1000 this AM. Saturating >90% on 2L NC. Lung sounds diminished throughout.   Tolerating diet but describes poor appetite this AM, denies n/v. + bowel sounds, denies flatus, LBM yesterday 1/6.  Pt ambulates 1x, generalized weakness. High fall risk, bed alarm on.   Updated on plan of care. Safety education provided. Bed locked in low. Call light within reach. Rounding in place.

## 2020-01-07 NOTE — DISCHARGE PLANNING
Agency/Facility Name: [facility]  Outcome: Left voice message for Renee regarding transportation to facility. Awaiting a call back.

## 2020-01-07 NOTE — DISCHARGE PLANNING
Anticipated Discharge Disposition: SNF-[place]    Action: RN CM notified pt and daughter of acceptance to [facility]; pt agreeable to transfer.    Barriers to Discharge: Medical clearance pending.     Plan: Await medical clearance for transfer to SNF.

## 2020-01-07 NOTE — DISCHARGE PLANNING
Agency/Facility Name: [facility]  Spoke To: Renee  Outcome: Patient accepted.

## 2020-01-08 ENCOUNTER — APPOINTMENT (OUTPATIENT)
Dept: RADIOLOGY | Facility: MEDICAL CENTER | Age: 85
DRG: 183 | End: 2020-01-08
Attending: FAMILY MEDICINE
Payer: MEDICARE

## 2020-01-08 LAB
25(OH)D3 SERPL-MCNC: 33 NG/ML (ref 30–100)
ANION GAP SERPL CALC-SCNC: 7 MMOL/L (ref 0–11.9)
BASOPHILS # BLD AUTO: 0.6 % (ref 0–1.8)
BASOPHILS # BLD: 0.03 K/UL (ref 0–0.12)
BUN SERPL-MCNC: 14 MG/DL (ref 8–22)
CALCIUM SERPL-MCNC: 8.4 MG/DL (ref 8.5–10.5)
CHLORIDE SERPL-SCNC: 97 MMOL/L (ref 96–112)
CO2 SERPL-SCNC: 27 MMOL/L (ref 20–33)
CREAT SERPL-MCNC: 0.81 MG/DL (ref 0.5–1.4)
EOSINOPHIL # BLD AUTO: 0.2 K/UL (ref 0–0.51)
EOSINOPHIL NFR BLD: 4.3 % (ref 0–6.9)
ERYTHROCYTE [DISTWIDTH] IN BLOOD BY AUTOMATED COUNT: 45.4 FL (ref 35.9–50)
EST. AVERAGE GLUCOSE BLD GHB EST-MCNC: 151 MG/DL
GLUCOSE SERPL-MCNC: 133 MG/DL (ref 65–99)
HBA1C MFR BLD: 6.9 % (ref 0–5.6)
HCT VFR BLD AUTO: 34.5 % (ref 42–52)
HGB BLD-MCNC: 11.2 G/DL (ref 14–18)
IMM GRANULOCYTES # BLD AUTO: 0.02 K/UL (ref 0–0.11)
IMM GRANULOCYTES NFR BLD AUTO: 0.4 % (ref 0–0.9)
LYMPHOCYTES # BLD AUTO: 0.91 K/UL (ref 1–4.8)
LYMPHOCYTES NFR BLD: 19.5 % (ref 22–41)
MCH RBC QN AUTO: 31.2 PG (ref 27–33)
MCHC RBC AUTO-ENTMCNC: 32.5 G/DL (ref 33.7–35.3)
MCV RBC AUTO: 96.1 FL (ref 81.4–97.8)
MONOCYTES # BLD AUTO: 0.64 K/UL (ref 0–0.85)
MONOCYTES NFR BLD AUTO: 13.7 % (ref 0–13.4)
NEUTROPHILS # BLD AUTO: 2.86 K/UL (ref 1.82–7.42)
NEUTROPHILS NFR BLD: 61.5 % (ref 44–72)
NRBC # BLD AUTO: 0 K/UL
NRBC BLD-RTO: 0 /100 WBC
PLATELET # BLD AUTO: 186 K/UL (ref 164–446)
PMV BLD AUTO: 10 FL (ref 9–12.9)
POTASSIUM SERPL-SCNC: 4.2 MMOL/L (ref 3.6–5.5)
PROCALCITONIN SERPL-MCNC: 0.17 NG/ML
RBC # BLD AUTO: 3.59 M/UL (ref 4.7–6.1)
SODIUM SERPL-SCNC: 131 MMOL/L (ref 135–145)
VIT B12 SERPL-MCNC: 1457 PG/ML (ref 211–911)
WBC # BLD AUTO: 4.7 K/UL (ref 4.8–10.8)

## 2020-01-08 PROCEDURE — 85025 COMPLETE CBC W/AUTO DIFF WBC: CPT

## 2020-01-08 PROCEDURE — 770006 HCHG ROOM/CARE - MED/SURG/GYN SEMI*

## 2020-01-08 PROCEDURE — 83036 HEMOGLOBIN GLYCOSYLATED A1C: CPT

## 2020-01-08 PROCEDURE — 74018 RADEX ABDOMEN 1 VIEW: CPT

## 2020-01-08 PROCEDURE — 700102 HCHG RX REV CODE 250 W/ 637 OVERRIDE(OP): Performed by: HOSPITALIST

## 2020-01-08 PROCEDURE — 99232 SBSQ HOSP IP/OBS MODERATE 35: CPT | Performed by: FAMILY MEDICINE

## 2020-01-08 PROCEDURE — 82306 VITAMIN D 25 HYDROXY: CPT

## 2020-01-08 PROCEDURE — 36415 COLL VENOUS BLD VENIPUNCTURE: CPT

## 2020-01-08 PROCEDURE — 82607 VITAMIN B-12: CPT

## 2020-01-08 PROCEDURE — 700102 HCHG RX REV CODE 250 W/ 637 OVERRIDE(OP): Performed by: NURSE PRACTITIONER

## 2020-01-08 PROCEDURE — A9270 NON-COVERED ITEM OR SERVICE: HCPCS | Performed by: SURGERY

## 2020-01-08 PROCEDURE — 700102 HCHG RX REV CODE 250 W/ 637 OVERRIDE(OP): Performed by: FAMILY MEDICINE

## 2020-01-08 PROCEDURE — 84145 PROCALCITONIN (PCT): CPT

## 2020-01-08 PROCEDURE — A9270 NON-COVERED ITEM OR SERVICE: HCPCS | Performed by: HOSPITALIST

## 2020-01-08 PROCEDURE — A9270 NON-COVERED ITEM OR SERVICE: HCPCS | Performed by: FAMILY MEDICINE

## 2020-01-08 PROCEDURE — A9270 NON-COVERED ITEM OR SERVICE: HCPCS | Performed by: NURSE PRACTITIONER

## 2020-01-08 PROCEDURE — 80048 BASIC METABOLIC PNL TOTAL CA: CPT

## 2020-01-08 PROCEDURE — 700101 HCHG RX REV CODE 250: Performed by: SURGERY

## 2020-01-08 PROCEDURE — 700102 HCHG RX REV CODE 250 W/ 637 OVERRIDE(OP): Performed by: SURGERY

## 2020-01-08 PROCEDURE — 700101 HCHG RX REV CODE 250: Performed by: FAMILY MEDICINE

## 2020-01-08 RX ORDER — ENEMA 19; 7 G/133ML; G/133ML
1 ENEMA RECTAL ONCE
Status: COMPLETED | OUTPATIENT
Start: 2020-01-08 | End: 2020-01-08

## 2020-01-08 RX ORDER — CALCIUM CARBONATE 500 MG/1
500 TABLET, CHEWABLE ORAL PRN
Status: DISCONTINUED | OUTPATIENT
Start: 2020-01-08 | End: 2020-01-10 | Stop reason: HOSPADM

## 2020-01-08 RX ORDER — CALCIUM CARBONATE 500 MG/1
500 TABLET, CHEWABLE ORAL
Status: DISCONTINUED | OUTPATIENT
Start: 2020-01-08 | End: 2020-01-08

## 2020-01-08 RX ADMIN — AMITRIPTYLINE HYDROCHLORIDE 75 MG: 75 TABLET, FILM COATED ORAL at 22:26

## 2020-01-08 RX ADMIN — MAGNESIUM HYDROXIDE 30 ML: 400 SUSPENSION ORAL at 05:53

## 2020-01-08 RX ADMIN — OMEPRAZOLE 20 MG: 20 CAPSULE, DELAYED RELEASE ORAL at 05:53

## 2020-01-08 RX ADMIN — SODIUM PHOSPHATE 133 ML: 7; 19 ENEMA RECTAL at 17:45

## 2020-01-08 RX ADMIN — LACTULOSE 15 ML: 20 SOLUTION ORAL at 05:52

## 2020-01-08 RX ADMIN — POLYETHYLENE GLYCOL 3350 1 PACKET: 17 POWDER, FOR SOLUTION ORAL at 05:53

## 2020-01-08 RX ADMIN — GUAIFENESIN 600 MG: 600 TABLET, EXTENDED RELEASE ORAL at 05:53

## 2020-01-08 RX ADMIN — METOPROLOL SUCCINATE 25 MG: 25 TABLET, EXTENDED RELEASE ORAL at 05:53

## 2020-01-08 RX ADMIN — MAGNESIUM CITRATE 296 ML: 1.75 LIQUID ORAL at 14:29

## 2020-01-08 RX ADMIN — OXYCODONE HYDROCHLORIDE 2.5 MG: 5 TABLET ORAL at 00:55

## 2020-01-08 RX ADMIN — CELECOXIB 100 MG: 100 CAPSULE ORAL at 05:53

## 2020-01-08 RX ADMIN — LACTULOSE 15 ML: 20 SOLUTION ORAL at 13:28

## 2020-01-08 RX ADMIN — ANTACID TABLETS 500 MG: 500 TABLET, CHEWABLE ORAL at 22:26

## 2020-01-08 RX ADMIN — TAMSULOSIN HYDROCHLORIDE 0.4 MG: 0.4 CAPSULE ORAL at 10:06

## 2020-01-08 RX ADMIN — SENNOSIDES AND DOCUSATE SODIUM 2 TABLET: 8.6; 5 TABLET ORAL at 05:53

## 2020-01-08 RX ADMIN — LIDOCAINE 1 PATCH: 50 PATCH TOPICAL at 00:51

## 2020-01-08 RX ADMIN — ASPIRIN 81 MG: 81 TABLET, COATED ORAL at 05:53

## 2020-01-08 ASSESSMENT — ENCOUNTER SYMPTOMS
FLANK PAIN: 0
COUGH: 1
NECK PAIN: 0
DIZZINESS: 0
BLURRED VISION: 0
HEADACHES: 0
VOMITING: 0
WEAKNESS: 1
NERVOUS/ANXIOUS: 0
HEARTBURN: 0
PALPITATIONS: 0
DIARRHEA: 0
NAUSEA: 0
CHILLS: 0
DIAPHORESIS: 0
BACK PAIN: 0
SPEECH CHANGE: 0
CONSTIPATION: 1
FEVER: 0
SENSORY CHANGE: 0
SORE THROAT: 0
FOCAL WEAKNESS: 0
SHORTNESS OF BREATH: 0
ABDOMINAL PAIN: 0
WHEEZING: 0

## 2020-01-08 NOTE — PROGRESS NOTES
Bedside report received.  Assessment complete.  A&O x 4. Patient calls appropriately.  Patient ambulates with up x1 assist and FWW. Bed alarm on.   Patient reports pain is tolerable at this time.   Denies N&V. Tolerating regular diet.  Sacrum red but blanching. Waffle mattress applied to bed. Mepilex applied to sacrum.   + void, - flatus, - BM.  Patient denies SOB.  SCD's off.    Review plan with of care with patient. Call light and personal belongings with in reach. Hourly rounding in place. All needs met at this time.

## 2020-01-08 NOTE — PROGRESS NOTES
Received report from day shift RN. Assumed patient care at 1900  AOx4   Pain rated at 3/10, repositioned  1.5 L of O2 via nasal cannula  No complaints of nausea at this time, 6 small meals regular diet  Up x1 with FWW  +void +flatus +BM. Abdomen more distended  Moderate fall risk, bed alarm on  Call light within reach  Bed locked and in low position  POC discussed with patient  All needs met at this time

## 2020-01-08 NOTE — CARE PLAN
Problem: Communication  Goal: The ability to communicate needs accurately and effectively will improve  Outcome: PROGRESSING AS EXPECTED     Patient communicates needs effectively to staff    Problem: Safety  Goal: Will remain free from falls  Outcome: PROGRESSING AS EXPECTED     Appropriate fall precautions in place

## 2020-01-08 NOTE — PROGRESS NOTES
[facility] Medicine Daily Progress Note    Date of Service  1/8/2020    Chief Complaint  85 y.o. male admitted 1/3/2020 with Rib fractures.    Hospital Course  Admitted with Rib fractures secondary to fall.    Interval Problem Update  Rib fracture - pain better controlled  Atelectasis - Tmax 99.2  HTN - sbp 120-130  Diabetes - BS low 100s  Hyponatremia - 131  Constipation - no BM yesterday, abdomen feels distended, Xray reviewed    Consultants/Specialty  Surgery    Code Status  Full    Disposition  Rehab/SNF    Review of Systems  Review of Systems   Constitutional: Positive for malaise/fatigue. Negative for chills, diaphoresis and fever.   HENT: Negative for hearing loss and sore throat.    Eyes: Negative for blurred vision.   Respiratory: Positive for cough. Negative for shortness of breath and wheezing.    Cardiovascular: Positive for chest pain and leg swelling. Negative for palpitations.   Gastrointestinal: Positive for constipation. Negative for abdominal pain, diarrhea, heartburn, nausea and vomiting.   Genitourinary: Negative for dysuria, flank pain and hematuria.   Musculoskeletal: Negative for back pain and neck pain.   Skin: Negative for rash.   Neurological: Positive for weakness. Negative for dizziness, sensory change, speech change, focal weakness and headaches.   Psychiatric/Behavioral: The patient is not nervous/anxious.         Physical Exam  Temp:  [36.6 °C (97.9 °F)-37.3 °C (99.2 °F)] 36.8 °C (98.3 °F)  Pulse:  [77-94] 77  Resp:  [15-18] 15  BP: (109-149)/(70-78) 130/76  SpO2:  [94 %-97 %] 94 %    Physical Exam  HENT:      Head: Normocephalic and atraumatic.      Nose: No congestion.      Mouth/Throat:      Mouth: Mucous membranes are moist.   Eyes:      Conjunctiva/sclera: Conjunctivae normal.      Pupils: Pupils are equal, round, and reactive to light.   Neck:      Musculoskeletal: No muscular tenderness.   Cardiovascular:      Rate and Rhythm: Normal rate and regular rhythm.   Pulmonary:       Effort: Pulmonary effort is normal.      Breath sounds: Examination of the left-lower field reveals decreased breath sounds. Decreased breath sounds present.   Abdominal:      General: Bowel sounds are normal. There is distension.      Palpations: Abdomen is soft.      Tenderness: There is no tenderness. There is no guarding or rebound.   Musculoskeletal:      Right lower leg: Edema present.      Left lower leg: Edema present.   Lymphadenopathy:      Cervical: No cervical adenopathy.   Skin:     General: Skin is warm and dry.   Neurological:      General: No focal deficit present.      Mental Status: He is alert and oriented to person, place, and time.      Cranial Nerves: No cranial nerve deficit.         Fluids    Intake/Output Summary (Last 24 hours) at 1/8/2020 1341  Last data filed at 1/8/2020 0948  Gross per 24 hour   Intake 480 ml   Output 425 ml   Net 55 ml       Laboratory  Recent Labs     01/06/20  0424 01/07/20  0430 01/08/20  0803   WBC 6.2 4.6* 4.7*   RBC 3.37* 3.51* 3.59*   HEMOGLOBIN 10.9* 11.1* 11.2*   HEMATOCRIT 32.5* 33.8* 34.5*   MCV 96.4 96.3 96.1   MCH 32.3 31.6 31.2   MCHC 33.5* 32.8* 32.5*   RDW 45.9 46.0 45.4   PLATELETCT 178 187 186   MPV 10.2 10.2 10.0     Recent Labs     01/06/20  0424 01/07/20  0430 01/08/20  0803   SODIUM 131* 131* 131*   POTASSIUM 4.1 4.5 4.2   CHLORIDE 96 97 97   CO2 26 28 27   GLUCOSE 131* 137* 133*   BUN 19 18 14   CREATININE 0.82 0.90 0.81   CALCIUM 8.2* 8.1* 8.4*                   Imaging  [id number] VIEW   Final Result      Nonspecific mild-moderate dilation of the colon      DX-CHEST-PORTABLE (1 VIEW)   Final Result      Stable bibasilar atelectasis, right greater than left.      DX-CHEST-PORTABLE (1 VIEW)   Final Result      Stable bibasilar atelectasis.      US-RUQ   Final Result      1.  No acute sonographic abnormality. Cholecystectomy.   2.  Partially visualized right pleural effusion.      US-EXTREMITY VENOUS LOWER BILAT   Final Result       DX-CHEST-PORTABLE (1 VIEW)   Final Result      Persistent right basilar opacity without significant change.      [id number] VIEW   Final Result      Nonspecific generalized gaseous distention of colon and small bowel.      OUTSIDE IMAGES-CT CHEST   Final Result      OUTSIDE IMAGES-DX CHEST   Final Result           Assessment/Plan  * Fracture of multiple ribs- (present on admission)  Assessment & Plan  Pain control  Encourage IS  RT protocol    Atelectasis- (present on admission)  Assessment & Plan  Encourage IS  Ambulate    Delirium- (present on admission)  Assessment & Plan  Avoid benzodiazepines and anticholinergics  Frequent orientation  Avoid early morning labs  Avoid vital signs during sleep  Ambulate if possible      Constipation- (present on admission)  Assessment & Plan  Bowel protocol  Mg citrate and Fleet enema today    Acute hypoxemic respiratory failure (HCC)- (present on admission)  Assessment & Plan  Encourage IS  RT protocol    Hypomagnesemia- (present on admission)  Assessment & Plan  Recheck level    Type 2 diabetes mellitus without complication, without long-term current use of insulin (HCC)- (present on admission)  Assessment & Plan  hgba1c pending    Hyponatremia- (present on admission)  Assessment & Plan  Follow bmp    Elevated LFTs- (present on admission)  Assessment & Plan  Follow cmp    Frequent falls- (present on admission)  Assessment & Plan  PT and OT     Anemia- (present on admission)  Assessment & Plan  Follow cbc    HTN (hypertension)- (present on admission)  Assessment & Plan  Metoprolol    CAD (coronary artery disease)- (present on admission)  Assessment & Plan  Metoprolol, Pravastatin, ASA    History of pulmonary embolism- (present on admission)  Assessment & Plan  History of 4/2016  Was not on anticoagulation    Pleural effusion- (present on admission)  Assessment & Plan  Watch fluid status      BPH (benign prostatic hyperplasia)- (present on admission)  Assessment &  Plan  Flomax        VTE prophylaxis: SCD

## 2020-01-09 VITALS
DIASTOLIC BLOOD PRESSURE: 78 MMHG | HEART RATE: 78 BPM | TEMPERATURE: 98.3 F | RESPIRATION RATE: 16 BRPM | OXYGEN SATURATION: 91 % | BODY MASS INDEX: 28.93 KG/M2 | HEIGHT: 73 IN | WEIGHT: 218.26 LBS | SYSTOLIC BLOOD PRESSURE: 124 MMHG

## 2020-01-09 LAB
ANION GAP SERPL CALC-SCNC: 10 MMOL/L (ref 0–11.9)
BASOPHILS # BLD AUTO: 0.7 % (ref 0–1.8)
BASOPHILS # BLD: 0.04 K/UL (ref 0–0.12)
BUN SERPL-MCNC: 13 MG/DL (ref 8–22)
CALCIUM SERPL-MCNC: 8.4 MG/DL (ref 8.5–10.5)
CHLORIDE SERPL-SCNC: 97 MMOL/L (ref 96–112)
CO2 SERPL-SCNC: 27 MMOL/L (ref 20–33)
CREAT SERPL-MCNC: 0.79 MG/DL (ref 0.5–1.4)
EOSINOPHIL # BLD AUTO: 0.2 K/UL (ref 0–0.51)
EOSINOPHIL NFR BLD: 3.5 % (ref 0–6.9)
ERYTHROCYTE [DISTWIDTH] IN BLOOD BY AUTOMATED COUNT: 45.1 FL (ref 35.9–50)
GLUCOSE SERPL-MCNC: 158 MG/DL (ref 65–99)
HCT VFR BLD AUTO: 36.9 % (ref 42–52)
HGB BLD-MCNC: 12.1 G/DL (ref 14–18)
IMM GRANULOCYTES # BLD AUTO: 0.01 K/UL (ref 0–0.11)
IMM GRANULOCYTES NFR BLD AUTO: 0.2 % (ref 0–0.9)
LYMPHOCYTES # BLD AUTO: 0.78 K/UL (ref 1–4.8)
LYMPHOCYTES NFR BLD: 13.8 % (ref 22–41)
MCH RBC QN AUTO: 31.5 PG (ref 27–33)
MCHC RBC AUTO-ENTMCNC: 32.8 G/DL (ref 33.7–35.3)
MCV RBC AUTO: 96.1 FL (ref 81.4–97.8)
MONOCYTES # BLD AUTO: 0.65 K/UL (ref 0–0.85)
MONOCYTES NFR BLD AUTO: 11.5 % (ref 0–13.4)
NEUTROPHILS # BLD AUTO: 3.98 K/UL (ref 1.82–7.42)
NEUTROPHILS NFR BLD: 70.3 % (ref 44–72)
NRBC # BLD AUTO: 0 K/UL
NRBC BLD-RTO: 0 /100 WBC
PLATELET # BLD AUTO: 219 K/UL (ref 164–446)
PMV BLD AUTO: 10.3 FL (ref 9–12.9)
POTASSIUM SERPL-SCNC: 4.5 MMOL/L (ref 3.6–5.5)
RBC # BLD AUTO: 3.84 M/UL (ref 4.7–6.1)
SODIUM SERPL-SCNC: 134 MMOL/L (ref 135–145)
WBC # BLD AUTO: 5.7 K/UL (ref 4.8–10.8)

## 2020-01-09 PROCEDURE — 97530 THERAPEUTIC ACTIVITIES: CPT

## 2020-01-09 PROCEDURE — 94760 N-INVAS EAR/PLS OXIMETRY 1: CPT

## 2020-01-09 PROCEDURE — 700102 HCHG RX REV CODE 250 W/ 637 OVERRIDE(OP): Performed by: SURGERY

## 2020-01-09 PROCEDURE — 700102 HCHG RX REV CODE 250 W/ 637 OVERRIDE(OP): Performed by: FAMILY MEDICINE

## 2020-01-09 PROCEDURE — A9270 NON-COVERED ITEM OR SERVICE: HCPCS | Performed by: FAMILY MEDICINE

## 2020-01-09 PROCEDURE — 80048 BASIC METABOLIC PNL TOTAL CA: CPT

## 2020-01-09 PROCEDURE — 99239 HOSP IP/OBS DSCHRG MGMT >30: CPT | Performed by: FAMILY MEDICINE

## 2020-01-09 PROCEDURE — 770006 HCHG ROOM/CARE - MED/SURG/GYN SEMI*

## 2020-01-09 PROCEDURE — 36415 COLL VENOUS BLD VENIPUNCTURE: CPT

## 2020-01-09 PROCEDURE — 700101 HCHG RX REV CODE 250: Performed by: SURGERY

## 2020-01-09 PROCEDURE — A9270 NON-COVERED ITEM OR SERVICE: HCPCS | Performed by: NURSE PRACTITIONER

## 2020-01-09 PROCEDURE — A9270 NON-COVERED ITEM OR SERVICE: HCPCS | Performed by: SURGERY

## 2020-01-09 PROCEDURE — 85025 COMPLETE CBC W/AUTO DIFF WBC: CPT

## 2020-01-09 PROCEDURE — A9270 NON-COVERED ITEM OR SERVICE: HCPCS | Performed by: HOSPITALIST

## 2020-01-09 PROCEDURE — 700102 HCHG RX REV CODE 250 W/ 637 OVERRIDE(OP): Performed by: HOSPITALIST

## 2020-01-09 PROCEDURE — 700102 HCHG RX REV CODE 250 W/ 637 OVERRIDE(OP): Performed by: NURSE PRACTITIONER

## 2020-01-09 RX ORDER — POLYETHYLENE GLYCOL 3350 17 G/17G
17 POWDER, FOR SOLUTION ORAL 2 TIMES DAILY
Refills: 3
Start: 2020-01-09

## 2020-01-09 RX ORDER — LIDOCAINE 50 MG/G
1 PATCH TOPICAL EVERY 24 HOURS
Qty: 10 PATCH
Start: 2020-01-10 | End: 2022-07-18

## 2020-01-09 RX ORDER — AMOXICILLIN 250 MG
2 CAPSULE ORAL 2 TIMES DAILY
Qty: 30 TAB | Refills: 0
Start: 2020-01-09

## 2020-01-09 RX ORDER — OXYCODONE HYDROCHLORIDE 5 MG/1
2.5 TABLET ORAL EVERY 4 HOURS PRN
Qty: 9 TAB | Refills: 0 | Status: SHIPPED | OUTPATIENT
Start: 2020-01-09 | End: 2020-01-12

## 2020-01-09 RX ORDER — LACTULOSE 20 G/30ML
15 SOLUTION ORAL 3 TIMES DAILY
Qty: 450 EACH
Start: 2020-01-09 | End: 2022-07-18

## 2020-01-09 RX ADMIN — LIDOCAINE 1 PATCH: 50 PATCH TOPICAL at 01:07

## 2020-01-09 RX ADMIN — LACTULOSE 15 ML: 20 SOLUTION ORAL at 05:46

## 2020-01-09 RX ADMIN — MAGNESIUM HYDROXIDE 30 ML: 400 SUSPENSION ORAL at 05:46

## 2020-01-09 RX ADMIN — OMEPRAZOLE 20 MG: 20 CAPSULE, DELAYED RELEASE ORAL at 05:46

## 2020-01-09 RX ADMIN — CELECOXIB 100 MG: 100 CAPSULE ORAL at 05:46

## 2020-01-09 RX ADMIN — GUAIFENESIN 600 MG: 600 TABLET, EXTENDED RELEASE ORAL at 05:46

## 2020-01-09 RX ADMIN — SENNOSIDES AND DOCUSATE SODIUM 2 TABLET: 8.6; 5 TABLET ORAL at 05:46

## 2020-01-09 RX ADMIN — ASPIRIN 81 MG: 81 TABLET, COATED ORAL at 05:46

## 2020-01-09 RX ADMIN — POLYETHYLENE GLYCOL 3350 1 PACKET: 17 POWDER, FOR SOLUTION ORAL at 05:46

## 2020-01-09 RX ADMIN — METOPROLOL SUCCINATE 25 MG: 25 TABLET, EXTENDED RELEASE ORAL at 05:46

## 2020-01-09 RX ADMIN — TAMSULOSIN HYDROCHLORIDE 0.4 MG: 0.4 CAPSULE ORAL at 11:19

## 2020-01-09 ASSESSMENT — COGNITIVE AND FUNCTIONAL STATUS - GENERAL
SUGGESTED CMS G CODE MODIFIER DAILY ACTIVITY: CK
DAILY ACTIVITIY SCORE: 16
DRESSING REGULAR UPPER BODY CLOTHING: A LITTLE
TOILETING: A LOT
DRESSING REGULAR LOWER BODY CLOTHING: A LOT
HELP NEEDED FOR BATHING: A LOT
PERSONAL GROOMING: A LITTLE

## 2020-01-09 NOTE — CARE PLAN
Problem: Safety  Goal: Will remain free from falls  Outcome: PROGRESSING AS EXPECTED  Note:   Bed locked and in lowest position, chair and bed alarm on, call light within reach, patient educated to ambulate only with assistance      Problem: Respiratory:  Goal: Respiratory status will improve  Outcome: PROGRESSING AS EXPECTED  Note:   Patient up to chair for all meals, encourage IS, encourage turn, cough, deep breath, encourage ambulation

## 2020-01-09 NOTE — DISCHARGE SUMMARY
Discharge Summary    CHIEF COMPLAINT ON ADMISSION  Chief Complaint   Patient presents with   • Rib Pain     known 8, 9, 10 right rib fractures with right pleural effusion       Reason for Admission  EMS Transfer     CODE STATUS  Full Code    HPI & HOSPITAL COURSE  This is a 85 y.o. male here with multiple rib fractures on the right side secondary to a fall.  He required oxygen supplementation, trauma surgery was initially on the case, the rib fractures were nonsurgical.  He also had problems with ileus and constipation and needed aggressive bowel protocol.  He has not had several large bowel movements.  Continues to require oxygen supplementation.  He has been encouraged to use incentive spirometer.  Chest x-ray showed atelectasis.  Physical therapy and Occupational Therapy came to evaluate him he would need continued rehabitation skilled nursing facility.       Therefore, he is discharged in good and stable condition to skilled nursing facility.    The patient met 2-midnight criteria for an inpatient stay at the time of discharge.      FOLLOW UP ITEMS POST DISCHARGE  None    DISCHARGE DIAGNOSES  Principal Problem:    Fracture of multiple ribs POA: Yes  Active Problems:    Acute hypoxemic respiratory failure (HCC) POA: Yes    Constipation POA: Yes    Delirium POA: Yes    Atelectasis POA: Yes    Pleural effusion POA: Yes    History of pulmonary embolism POA: Yes    CAD (coronary artery disease) POA: Yes    HTN (hypertension) POA: Yes    Anemia POA: Yes    Frequent falls POA: Yes    Elevated LFTs POA: Yes    Hyponatremia POA: Yes    Type 2 diabetes mellitus without complication, without long-term current use of insulin (HCC) POA: Yes    Hypomagnesemia POA: Yes    BPH (benign prostatic hyperplasia) POA: Yes  Resolved Problems:    * No resolved hospital problems. *      FOLLOW UP  No future appointments.  [address]  [address]  [phone]          MEDICATIONS ON DISCHARGE     Medication List      START taking these medications      Instructions   lactulose 20 GM/30ML Soln   Take 15 mL by mouth 3 times a day.  Dose:  15 mL     lidocaine 5 % Ptch  Start taking on:  January 10, 2020  Commonly known as:  LIDODERM   Apply 1 Patch to skin as directed every 24 hours.  Dose:  1 Patch     magnesium hydroxide 400 MG/5ML Susp  Start taking on:  January 10, 2020  Commonly known as:  MILK OF MAGNESIA   Take 30 mL by mouth every day.  Dose:  30 mL     oxyCODONE immediate-release 5 MG Tabs  Commonly known as:  ROXICODONE   Take 0.5 Tabs by mouth every four hours as needed for up to 3 days.  Dose:  2.5 mg     polyethylene glycol/lytes Pack  Commonly known as:  MIRALAX   Take 1 Packet by mouth 2 times a day.  Dose:  17 g     senna-docusate 8.6-50 MG Tabs  Commonly known as:  PERICOLACE or SENOKOT S   Take 2 Tabs by mouth 2 Times a Day.  Dose:  2 Tab        CONTINUE taking these medications      Instructions   metFORMIN 500 MG Tabs  Commonly known as:  GLUCOPHAGE   Take 500 mg by mouth 2 times a day, with meals.  Dose:  500 mg     metoprolol SR 25 MG Tb24  Commonly known as:  TOPROL XL   Take 25 mg by mouth every day.  Dose:  25 mg     Non Formulary Request   Take 40 mg by mouth every morning before breakfast. Nexium - Esomeprazole  Dose:  40 mg     pravastatin 20 MG Tabs  Commonly known as:  PRAVACHOL   Take 40 mg by mouth every evening.  Dose:  40 mg     tamsulosin 0.4 MG capsule  Commonly known as:  FLOMAX   Take 0.4 mg by mouth ONE-HALF HOUR AFTER BREAKFAST.  Dose:  0.4 mg        STOP taking these medications    amitriptyline 25 MG Tabs  Commonly known as:  ELAVIL            Allergies  No Known Allergies    DIET  Orders Placed This Encounter   Procedures   • Diet Order Regular     Standing Status:   Standing     Number of Occurrences:   1     Order Specific Question:   Diet:     Answer:   Regular [1]     Order Specific Question:   Miscellaneous  modifications:     Answer:   6 Small Meals [4]       ACTIVITY  As tolerated and directed by skilled nursing.  Weight bearing as tolerated    LINES, DRAINS, AND WOUNDS  This is an automated list. Peripheral IVs will be removed prior to discharge.  Peripheral IV 01/03/20 20 G Left Upper arm (Active)   Site Assessment Clean;Dry;Intact 1/8/2020  9:00 PM   Dressing Type Occlusive;Transparent 1/8/2020  9:00 PM   Line Status Saline locked;Scrubbed the hub prior to access;Flushed 1/8/2020  9:00 PM   Dressing Status Clean;Dry;Intact 1/8/2020  9:00 PM   Dressing Intervention N/A 1/8/2020  9:00 PM   Infil[place] Grading [place] 0 1/8/2020  9:00 PM   Phlebitis Scale ([facility] Only) 0 1/8/2020  9:00 PM          Peripheral IV 01/03/20 20 G Left Upper arm (Active)   Site Assessment Clean;Dry;Intact 1/8/2020  9:00 PM   Dressing Type Occlusive;Transparent 1/8/2020  9:00 PM   Line Status Saline locked;Scrubbed the hub prior to access;Flushed 1/8/2020  9:00 PM   Dressing Status Clean;Dry;Intact 1/8/2020  9:00 PM   Dressing Intervention N/A 1/8/2020  9:00 PM   Infiltration Grading ([facility] 0 1/8/2020  9:00 PM   Phlebitis Scale ([facility] Only) 0 1/8/2020  9:00 PM               MENTAL STATUS ON TRANSFER  Level of Consciousness: Alert  Orientation : Oriented x 4  Speech: Speech Clear    CONSULTATIONS  Surgery - [place]    PROCEDURES  None    LABORATORY  Lab Results   Component Value Date    SODIUM 131 (L) 01/08/2020    POTASSIUM 4.2 01/08/2020    CHLORIDE 97 01/08/2020    CO2 27 01/08/2020    GLUCOSE 133 (H) 01/08/2020    BUN 14 01/08/2020    CREATININE 0.81 01/08/2020        Lab Results   Component Value Date    WBC 5.7 01/09/2020    HEMOGLOBIN 12.1 (L) 01/09/2020    HEMATOCRIT 36.9 (L) 01/09/2020    PLATELETCT 219 01/09/2020        Total time of the discharge process exceeds 40 minutes.

## 2020-01-09 NOTE — CARE PLAN
Problem: Safety  Goal: Will remain free from falls  Outcome: PROGRESSING AS EXPECTED  Note:   Bed alarm on, call light within reach, bed and chair alarm on, patient educated to call for assistance      Problem: Skin Integrity  Goal: Risk for impaired skin integrity will decrease  Outcome: PROGRESSING AS EXPECTED  Note:   Waffle mattress in place, encourage patient to change positions frequently, sacrum red with blanching      Problem: Mobility  Goal: Risk for activity intolerance will decrease  Outcome: PROGRESSING AS EXPECTED  Note:   Patient up to chair for most of day.

## 2020-01-09 NOTE — DISCHARGE PLANNING
Received Transport Form @ 1030  Spoke to Eric @ [facility]    Transport is scheduled for 1/9 @1600 going to [facility].    MONIKA Gilbert notified. Renee @ [facility] notified of transport time.

## 2020-01-09 NOTE — DISCHARGE PLANNING
Agency/Facility Name: [facility]  Outcome: Left voice message for Renee in admissions in regards to bed availability/transport. Requested a call back.

## 2020-01-09 NOTE — PROGRESS NOTES
"Pt A&O x 4.     Vitals: /79   Pulse 95   Temp 37.1 °C (98.8 °F) (Temporal)   Resp 16   Ht 1.854 m (6' 1\")   Wt 99 kg (218 lb 4.1 oz)   SpO2 93%   BMI 28.80 kg/m²      Pt rates pain 2 out of 10. Declines additional interventions at this time.      Neuro: MANCUSO. Denies new onset of numbness/ tingling.     Cardiac: Denies new onset of chest pain.     Vascular: Pulses 2+ BUE, BLE. No edema noted.     Respiratory: Lungs sound clear to auscultation. Pulling 1250 on IS, effective, strong effort. On 2L of O2 via nasal cannula.  on, satting in 90's. Denies SOB.     GI: Abdomen distended, non-tender. Hypoactive bowel sounds, + flatus, + large BM. Denies nausea/ vomiting.     : Pt voiding adequately.      MSK: Pt up to bathroom with stand by assist, tolerating well.     Integumentary: Significant bruising to R flank. Skin intact throughout.     Labs noted.     Fall precautions in place: Bed locked in lowest position, Upper bed rails up, treaded socks in place, personal belongings within reach, call light within reach, appropriate mobility signs in place, - bed alarm. Pt calls appropriately.      Pt updated on POC.    "

## 2020-01-09 NOTE — DISCHARGE INSTRUCTIONS
Discharge Instructions    Discharged to other by medical transportation with escort. Discharged via wheelchair, hospital escort: Yes.  Special equipment needed: Not Applicable    Be sure to schedule a follow-up appointment with your primary care doctor or any specialists as instructed.     Discharge Plan:   Influenza Vaccine Indication: Not indicated: Previously immunized this influenza season and > 8 years of age    I understand that a diet low in cholesterol, fat, and sodium is recommended for good health. Unless I have been given specific instructions below for another diet, I accept this instruction as my diet prescription.       Special Instructions: None    · Is patient discharged on Warfarin / Coumadin?   No       Rib Fracture  A rib fracture is a break or crack in one of the bones of the ribs. The ribs are like a cage that goes around your upper chest. A broken or cracked rib is often painful, but most do not cause other problems. Most rib fractures heal on their own in 1-3 months.  Follow these instructions at home:  · Avoid activities that cause pain to the injured area. Protect your injured area.  · Slowly increase activity as told by your doctor.  · Take medicine as told by your doctor.  · Put ice on the injured area for the first 1-2 days after you have been treated or as told by your doctor.  ¨ Put ice in a plastic bag.  ¨ Place a towel between your skin and the bag.  ¨ Leave the ice on for 15-20 minutes at a time, every 2 hours while you are awake.  · Do deep breathing as told by your doctor. You may be told to:  ¨ Take deep breaths many times a day.  ¨ Cough many times a day while hugging a pillow.  ¨ Use a device (incentive spirometer) to perform deep breathing many times a day.  · Drink enough fluids to keep your pee (urine) clear or pale yellow.  · Do not wear a rib belt or binder. These do not allow you to breathe deeply.  Get help right away if:  · You have a fever.  · You have trouble  breathing.  · You cannot stop coughing.  · You cough up thick or bloody spit (mucus).  · You feel sick to your stomach (nauseous), throw up (vomit), or have belly (abdominal) pain.  · Your pain gets worse and medicine does not help.  This information is not intended to replace advice given to you by your health care provider. Make sure you discuss any questions you have with your health care provider.  Document Released: 09/26/2009 Document Revised: 05/25/2017 Document Reviewed: 02/19/2014  [employer] Patient Education © 2017 [employer]      Chest Wall Pain  Introduction  Chest wall pain is pain in or around the bones and muscles of your chest. Sometimes, an injury causes this pain. Sometimes, the cause may not be known. This pain may take several weeks or longer to get better.  Follow these instructions at home:  Pay attention to any changes in your symptoms. Take these actions to help with your pain:  · Rest as told by your doctor.  · Avoid activities that cause pain. Try not to use your chest, belly (abdominal), or side muscles to lift heavy things.  · If directed, apply ice to the painful area:  ¨ Put ice in a plastic bag.  ¨ Place a towel between your skin and the bag.  ¨ Leave the ice on for 20 minutes, 2-3 times per day.  · Take over-the-counter and prescription medicines only as told by your doctor.  · Do not use tobacco products, including cigarettes, chewing tobacco, and e-cigarettes. If you need help quitting, ask your doctor.  · Keep all follow-up visits as told by your doctor. This is important.  Contact a doctor if:  · You have a fever.  · Your chest pain gets worse.  · You have new symptoms.  Get help right away if:  · You feel sick to your stomach (nauseous) or you throw up (vomit).  · You feel sweaty or light-headed.  · You have a cough with phlegm (sputum) or you cough up blood.  · You are short of breath.  This information is not intended to replace advice given to you by your health  care provider. Make sure you discuss any questions you have with your health care provider.  Document Released: 06/05/2009 Document Revised: 05/25/2017 Document Reviewed: 03/14/2016  © 2017 [employer]    Depression / Suicide Risk    As you are discharged from this [facility] facility, it is important to learn how to keep safe from harming yourself.    Recognize the warning signs:  · Abrupt changes in personality, positive or negative- including increase in energy   · Giving away possessions  · Change in eating patterns- significant weight changes-  positive or negative  · Change in sleeping patterns- unable to sleep or sleeping all the time   · Unwillingness or inability to communicate  · Depression  · Unusual sadness, discouragement and loneliness  · Talk of wanting to die  · Neglect of personal appearance   · Rebelliousness- reckless behavior  · Withdrawal from people/activities they love  · Confusion- inability to concentrate     If you or a loved one observes any of these behaviors or has concerns about self-harm, here's what you can do:  · Talk about it- your feelings and reasons for harming yourself  · Remove any means that you might use to hurt yourself (examples: pills, rope, extension cords, firearm)  · Get professional help from the community (Mental Health, Substance Abuse, psychological counseling)  · Do not be alone:Call your Safe Contact- someone whom you trust who will be there for you.  · Call your local CRISIS HOTLINE 100-1681 or 369-275-7557  · Call your local Children's Mobile Crisis Response Team [place] (747) 986-8083 or www.App [employer]  · Call the toll free National Suicide Prevention Hotlines   · National Suicide Prevention Lifeline 637-660-QGHH (7048)  · National Hope Line Network 800-SUICIDE (838-8663)

## 2020-01-09 NOTE — PROGRESS NOTES
Dr. Urrutia paged regarding medication for acid reflux. New orders received for calcium carbonate PRN.

## 2020-01-10 NOTE — PROGRESS NOTES
Attempted to call report to RN for second time. Per admission services, no record that [facility] was supposed to receive patient today.     On call [place] paged and updated. Awaiting to hear back.     [id number]: [place]EL unable to reach anyone at [facility]     Charge RN aware

## 2020-01-10 NOTE — PROGRESS NOTES
Patient discharged to [facility] with med AF83 and staff escort. IV discontinued. Prescriptions placed in COBRA packet, consent signed and in chart. Discharge instructions given regarding importance of incentive spirometer, turn/cough/deep breath, frequent ambulation, up to chair for all meals, and when to seek medical attention. Education provided, patient asked questions and verbalized understanding. Discharge paperwork signed and in chart. 2 copies placed in COBRA packet. COBRA sent with patient. Patient is tolerating diet, stable when ambulating, and pain is well controlled. All belongings collected. No further questions or concerns at this time. Copy of COBRA placed in chart.     Attempted to call report, unable to reach RN at this time. Will try again.

## 2020-01-15 NOTE — DOCUMENTATION QUERY
[facility]                                                                       Query Response Note      PATIENT:               CRUZ AVILA  ACCT #:                  [id number]  MRN:                     [id number]  :                      1934  ADMIT DATE:       1/3/2020 9:47 PM  DISCH DATE:        2020 5:30 PM  RESPONDING  PROVIDER #:        [id number]           QUERY TEXT:    Delirium is documented in the Medical Record. Can the etiology of delirium be further specified?     NOTE: If an appropriate response is not listed below, please respond with a new note.      The patient's Clinical Indicators include:  Per Progress Note    -lethargic and confused when I woke him up at 9 AM today   -Delirium     Per Progress Note   -A/O x 4, cooperative, appropriate, but intermittently confused   -Delirium   -Resolved     Treatment:   Hold gabapentin, frequent reorientation, up in chair with meals, avoid night interruptions, & Physical & Occupational Therapy    Risk Factors:   Age, debility, multiple rib fractures, atelectasis, pleural effusion, & acute hypoxemic respiratory failure  Options provided:   -- Delirium due to multiple etiologies   -- Delirium due to known physiological condition, (please specify the known physiological condition)   -- Delirium of unknown etiology   -- Unable to determine      Query created by: Skylar Hernandez on 2020 2:14 PM    RESPONSE TEXT:    Delirium due to multiple etiologies          Electronically signed by:  ANTONIETTA DRAPER MD 1/15/2020 7:35 AM

## 2023-06-30 NOTE — ASSESSMENT & PLAN NOTE
Pt scheduled for 7/18   Right 8th, 9th, and 10th rib fractures.  Aggressive multimodal pain management, and pulmonary hygiene.   Serial chest radiographs.